# Patient Record
Sex: MALE | Race: BLACK OR AFRICAN AMERICAN | NOT HISPANIC OR LATINO | Employment: FULL TIME | URBAN - METROPOLITAN AREA
[De-identification: names, ages, dates, MRNs, and addresses within clinical notes are randomized per-mention and may not be internally consistent; named-entity substitution may affect disease eponyms.]

---

## 2019-05-13 ENCOUNTER — OFFICE VISIT (OUTPATIENT)
Dept: FAMILY MEDICINE | Facility: CLINIC | Age: 44
End: 2019-05-13
Payer: COMMERCIAL

## 2019-05-13 VITALS
BODY MASS INDEX: 31.88 KG/M2 | WEIGHT: 222.69 LBS | HEART RATE: 68 BPM | SYSTOLIC BLOOD PRESSURE: 122 MMHG | OXYGEN SATURATION: 98 % | TEMPERATURE: 98 F | DIASTOLIC BLOOD PRESSURE: 80 MMHG | HEIGHT: 70 IN

## 2019-05-13 DIAGNOSIS — E66.9 OBESITY, CLASS I, BMI 30-34.9: ICD-10-CM

## 2019-05-13 DIAGNOSIS — R07.89 OTHER CHEST PAIN: Primary | ICD-10-CM

## 2019-05-13 DIAGNOSIS — K21.9 GASTROESOPHAGEAL REFLUX DISEASE WITHOUT ESOPHAGITIS: ICD-10-CM

## 2019-05-13 DIAGNOSIS — I10 HYPERTENSION, UNSPECIFIED TYPE: ICD-10-CM

## 2019-05-13 PROCEDURE — 99999 PR PBB SHADOW E&M-EST. PATIENT-LVL III: ICD-10-PCS | Mod: PBBFAC,,, | Performed by: PHYSICIAN ASSISTANT

## 2019-05-13 PROCEDURE — 93010 ELECTROCARDIOGRAM REPORT: CPT | Mod: S$GLB,,, | Performed by: INTERNAL MEDICINE

## 2019-05-13 PROCEDURE — 3008F PR BODY MASS INDEX (BMI) DOCUMENTED: ICD-10-PCS | Mod: CPTII,S$GLB,, | Performed by: PHYSICIAN ASSISTANT

## 2019-05-13 PROCEDURE — 3008F BODY MASS INDEX DOCD: CPT | Mod: CPTII,S$GLB,, | Performed by: PHYSICIAN ASSISTANT

## 2019-05-13 PROCEDURE — 99214 OFFICE O/P EST MOD 30 MIN: CPT | Mod: 25,S$GLB,, | Performed by: PHYSICIAN ASSISTANT

## 2019-05-13 PROCEDURE — 3079F PR MOST RECENT DIASTOLIC BLOOD PRESSURE 80-89 MM HG: ICD-10-PCS | Mod: CPTII,S$GLB,, | Performed by: PHYSICIAN ASSISTANT

## 2019-05-13 PROCEDURE — 93010 EKG 12-LEAD: ICD-10-PCS | Mod: S$GLB,,, | Performed by: INTERNAL MEDICINE

## 2019-05-13 PROCEDURE — 3074F PR MOST RECENT SYSTOLIC BLOOD PRESSURE < 130 MM HG: ICD-10-PCS | Mod: CPTII,S$GLB,, | Performed by: PHYSICIAN ASSISTANT

## 2019-05-13 PROCEDURE — 3074F SYST BP LT 130 MM HG: CPT | Mod: CPTII,S$GLB,, | Performed by: PHYSICIAN ASSISTANT

## 2019-05-13 PROCEDURE — 99999 PR PBB SHADOW E&M-EST. PATIENT-LVL III: CPT | Mod: PBBFAC,,, | Performed by: PHYSICIAN ASSISTANT

## 2019-05-13 PROCEDURE — 3079F DIAST BP 80-89 MM HG: CPT | Mod: CPTII,S$GLB,, | Performed by: PHYSICIAN ASSISTANT

## 2019-05-13 PROCEDURE — 99214 PR OFFICE/OUTPT VISIT, EST, LEVL IV, 30-39 MIN: ICD-10-PCS | Mod: 25,S$GLB,, | Performed by: PHYSICIAN ASSISTANT

## 2019-05-13 NOTE — PATIENT INSTRUCTIONS
Noncardiac Chest Pain    Based on your visit today, the healthcare provider doesnt know what is causing your chest pain. In most cases, people who come to the emergency department with chest pain dont have a problem with their heart. Instead, the pain is caused by other conditions. It's important for the healthcare team to be sure you are not having a life threatening cause for chest pain such as a heart attack, blood clot in the lungs, collapsed lung, ruptured esophagus, or tearing of the aorta. Once these major causes have been ruled out, you may have further evaluation for non-heart causes of chest pain. These may be problems with the lungs, muscles, bones, digestive tract, nerves, or mental health.  Lung problems  · Inflammation around the lungs (pleurisy)  · Collapsed lung (pneumothorax)  · Fluid around the lungs (pleural effusion)  · Lung cancer (a rare cause of chest pain)  Muscle or bone problems  · Inflamed cartilage between the ribs (costochondritis)  · Fibromyalgia  · Rheumatoid arthritis  · Chest wall strain  Digestive system problems  · Reflux  · Stomach ulcer  · Spasms of the esophagus  · Gall stones  · Gallbladder inflammation  Mental health conditions  · Panic or anxiety attacks  · Emotional distress  Your condition doesnt seem serious and your pain doesnt appear to be coming from your heart. But sometimes the signs of a serious problem take more time to appear. Watch for the warning signs listed below.  Home care  Follow these guidelines when caring for yourself at home:  · Rest today and avoid strenuous activity.  · Take any prescribed medicine as directed.  Follow-up care  Follow up with your healthcare provider, or as advised, if you dont start to feel better within 24 hours.  When to seek medical advice  Call your healthcare provider right away if any of these occur:  · A change in the type of pain. Call if it feels different, becomes more serious, lasts longer, or begins to spread into  your shoulder, arm, neck, jaw, or back.  · Shortness of breath  · You feel more pain when you breathe  · Cough with dark-colored mucus or blood  · Weakness, dizziness, or fainting  · Fever of 100.4ºF (38ºC) or higher, or as directed by your healthcare provider  · Swelling, pain, or redness in one leg  Date Last Reviewed: 12/1/2016  © 7202-1441 [employer]. [address] [place] [address]. All rights reserved. This information is not intended as a substitute for professional medical care. Always follow your healthcare professional's instructions.        Tips to Control Acid Reflux    To control acid reflux, youll need to make some basic diet and lifestyle changes. The simple steps outlined below may be all youll need to ease discomfort.  Watch what you eat  · Avoid fatty foods and spicy foods.  · Eat fewer acidic foods, such as citrus and tomato-based foods. These can increase symptoms.  · Limit drinking alcohol, caffeine, and fizzy beverages. All increase acid reflux.  · Try limiting chocolate, peppermint, and spearmint. These can worsen acid reflux in some people.  Watch when you eat  · Avoid lying down for 3 hours after eating.  · Do not snack before going to bed.  Raise your head  Raising your head and upper body by 4 to 6 inches helps limit reflux when youre lying down. Put blocks under the head of your bed frame to raise it.  Other changes  · Lose weight, if you need to  · Dont exercise near bedtime  · Avoid tight-fitting clothes  · Limit aspirin and ibuprofen  · Stop smoking   Date Last Reviewed: 7/1/2016 © 2000-2017 [employer]. [address] [place] [address]. All rights reserved. This information is not intended as a substitute for professional medical care. Always follow your healthcare professional's instructions.

## 2019-05-13 NOTE — ASSESSMENT & PLAN NOTE
The patient is asked to make an attempt to improve diet and exercise patterns to aid in medical management of this problem. May resume current work out regimen.

## 2019-05-13 NOTE — ASSESSMENT & PLAN NOTE
Stable, normotensive on arrival without medication  He has not taken BP medication for two years  Had two isolated elevated home readings   Continue to monitor home pressures  Will follow up with PCP next week

## 2019-05-13 NOTE — PROGRESS NOTES
Patient Name: Josemanuel Perera    : 1975  MRN: [id number]    Subjective:  Josemanuel is a 44 y.o. male who presents today for:    Chief Complaint   Patient presents with    Chest Pain    Shortness of Breath       HPI  Patient has multiple medical diagnoses as listed below in the history. He complains of intermittent chest pain for two weeks. He reports the pain to feel like pressure in the sternum region. The chest pain is worse at night and wakes him up. He has occasional symptoms during the day when driving while working as . He currently rates CP 2/10. He has associated RAHMAN, nausea, and night sweats. He had two episodes of elevated blood pressure last week. He has history of HTN and taking HCTZ in past, but reports not being on medication for over two years. He is normotensive on arrival today.He has no history of GERD. He often eats fried and spicy foods. He recently started running/working out and reports the symptoms to have began shortly after. He denies alleviating or exacerbating factors.       Past Medical History  Past Medical History:   Diagnosis Date    Insomnia     Kidney stones     h/o kidney stones       Past Surgical History  History reviewed. No pertinent surgical history.    Family History  Family History   Problem Relation Age of Onset    Hypertension Mother        Social History  Social History     Socioeconomic History    Marital status:      Spouse name: Not on file    Number of children: Not on file    Years of education: Not on file    Highest education level: Not on file   Occupational History    Not on file   Social Needs    Financial resource strain: Not on file    Food insecurity:     Worry: Not on file     Inability: Not on file    Transportation needs:     Medical: Not on file     Non-medical: Not on file   Tobacco Use    Smoking status: Never Smoker    Smokeless tobacco: Never Used   Substance and Sexual Activity    Alcohol use: Yes     "Drug use: No    Sexual activity: Not on file   Lifestyle    Physical activity:     Days per week: Not on file     Minutes per session: Not on file    Stress: Not on file   Relationships    Social connections:     Talks on phone: Not on file     Gets together: Not on file     Attends Caodaism service: Not on file     Active member of club or organization: Not on file     Attends meetings of clubs or organizations: Not on file     Relationship status: Not on file   Other Topics Concern    Not on file   Social History Narrative    Not on file       Current Medications  Current Outpatient Medications on File Prior to Visit   Medication Sig Dispense Refill    amitriptyline (ELAVIL) 10 MG tablet Take 1 tablet (10 mg total) by mouth nightly as needed for Insomnia. 30 tablet 5    hydrochlorothiazide (HYDRODIURIL) 12.5 MG Tab Take 1 tablet (12.5 mg total) by mouth once daily. 30 tablet 2    naproxen (NAPROSYN) 500 MG tablet Take 1 tablet (500 mg total) by mouth 2 (two) times daily with meals. 30 tablet 0     No current facility-administered medications on file prior to visit.        Allergies   Review of patient's allergies indicates:  No Known Allergies      ROS  Review of Systems   Constitutional: Negative for chills, fatigue and fever.   Respiratory: Positive for shortness of breath. Negative for cough.    Cardiovascular: Positive for chest pain. Negative for palpitations and leg swelling.   Gastrointestinal: Positive for nausea. Negative for abdominal pain, constipation and vomiting.   Musculoskeletal: Negative for arthralgias and myalgias.   Skin: Negative for rash.   Neurological: Negative for dizziness, syncope, weakness, light-headedness, numbness and headaches.   Hematological: Negative for adenopathy.         Objective:    /80 (BP Location: Left arm, Patient Position: Sitting, BP Method: Medium (Manual))   Pulse 68   Temp 98.1 °F (36.7 °C) (Oral)   Ht 5' 10" (1.778 m)   Wt 101 kg (222 lb 10.6 " oz)   SpO2 98%   BMI 31.95 kg/m²     Physical Exam   Constitutional: Vital signs are normal.   HENT:   Head: Normocephalic.   Eyes: Pupils are equal, round, and reactive to light. EOM are normal.   Neck: Neck supple. Carotid bruit is not present. No thyroid mass present.   Cardiovascular: Normal rate, regular rhythm, S1 normal, S2 normal and intact distal pulses. Exam reveals no gallop and no friction rub.   No murmur heard.  Pulses:       Dorsalis pedis pulses are 2+ on the right side, and 2+ on the left side.   Pulmonary/Chest: Effort normal and breath sounds normal. He has no wheezes. He has no rales.   Lymphadenopathy:     He has no cervical adenopathy.        Right: No supraclavicular adenopathy present.        Left: No supraclavicular adenopathy present.   Neurological: He is alert.   Skin: Skin is warm, dry and intact. No rash noted.   Psychiatric: He has a normal mood and affect. Judgment normal.       Assessment/Plan:  Josemanuel Perera is a 44 y.o. male who presents today for :    Josemanuel was seen today for chest pain and shortness of breath.    Diagnoses and all orders for this visit:    Other chest pain  -     IN OFFICE EKG 12-LEAD (to Muse)  Normal EKG    Gastroesophageal reflux disease without esophagitis  -     ranitidine (ZANTAC) 150 MG tablet; Take 1 tablet (150 mg total) by mouth nightly.    Normal EKG in office, normotensive with normal heart sounds on exam  Likely acid reflux in nature given history and findings  Will initiate zantac for better control, instructed to use Tums or Rolaids for acute symptoms  Avoid spicy and fried foods  Scheduled annual exam with PCP as it has been > 2 years  Counseled patient on the clinical course of condition including symptomatology, treatment and prevention  Counseled regarding risks, benefits, and limitations of medications  Advised patient to seek urgent/emergent care if symptoms intensify  Sent patient with informational material about  diagnosis  Patient gave verbal understanding and agreement of plan      Problem list issues addressed during this visit    Hypertension  Stable, normotensive on arrival without medication  He has not taken BP medication for two years  Had two isolated elevated home readings   Continue to monitor home pressures  Will follow up with PCP next week      Obesity, Class I, BMI 30-34.9  The patient is asked to make an attempt to improve diet and exercise patterns to aid in medical management of this problem. May resume current work out regimen.            Health maintenance reviewed and disussed, deferred at this time, will follow up at PCP appt      I spent >50% of this 25 minute encounter counseling the patient on diagnoses, risk factors, and treatment.         Encouraged to call/return to clinic if symptoms persist or worsen    Bharti Zuniga PA-C  [facility] Family Med/ Internal Med/ Peds

## 2019-05-23 ENCOUNTER — OFFICE VISIT (OUTPATIENT)
Dept: FAMILY MEDICINE | Facility: CLINIC | Age: 44
End: 2019-05-23
Payer: COMMERCIAL

## 2019-05-23 VITALS
BODY MASS INDEX: 31.82 KG/M2 | TEMPERATURE: 99 F | HEIGHT: 70 IN | WEIGHT: 222.25 LBS | HEART RATE: 101 BPM | SYSTOLIC BLOOD PRESSURE: 110 MMHG | OXYGEN SATURATION: 98 % | DIASTOLIC BLOOD PRESSURE: 70 MMHG

## 2019-05-23 DIAGNOSIS — G47.09 OTHER INSOMNIA: ICD-10-CM

## 2019-05-23 DIAGNOSIS — Z23 NEED FOR DIPHTHERIA, TETANUS, PERTUSSIS, AND HIB VACCINATION: ICD-10-CM

## 2019-05-23 DIAGNOSIS — Z00.00 ROUTINE MEDICAL EXAM: Primary | ICD-10-CM

## 2019-05-23 DIAGNOSIS — E66.9 OBESITY, CLASS I, BMI 30-34.9: Chronic | ICD-10-CM

## 2019-05-23 DIAGNOSIS — I10 ESSENTIAL HYPERTENSION: ICD-10-CM

## 2019-05-23 PROCEDURE — 90471 IMMUNIZATION ADMIN: CPT | Mod: S$GLB,,, | Performed by: INTERNAL MEDICINE

## 2019-05-23 PROCEDURE — 99999 PR PBB SHADOW E&M-EST. PATIENT-LVL III: CPT | Mod: PBBFAC,,, | Performed by: INTERNAL MEDICINE

## 2019-05-23 PROCEDURE — 3074F PR MOST RECENT SYSTOLIC BLOOD PRESSURE < 130 MM HG: ICD-10-PCS | Mod: CPTII,S$GLB,, | Performed by: INTERNAL MEDICINE

## 2019-05-23 PROCEDURE — 90471 TDAP VACCINE GREATER THAN OR EQUAL TO 7YO IM: ICD-10-PCS | Mod: S$GLB,,, | Performed by: INTERNAL MEDICINE

## 2019-05-23 PROCEDURE — 90715 TDAP VACCINE GREATER THAN OR EQUAL TO 7YO IM: ICD-10-PCS | Mod: S$GLB,,, | Performed by: INTERNAL MEDICINE

## 2019-05-23 PROCEDURE — 99396 PREV VISIT EST AGE 40-64: CPT | Mod: 25,S$GLB,, | Performed by: INTERNAL MEDICINE

## 2019-05-23 PROCEDURE — 99396 PR PREVENTIVE VISIT,EST,40-64: ICD-10-PCS | Mod: 25,S$GLB,, | Performed by: INTERNAL MEDICINE

## 2019-05-23 PROCEDURE — 99999 PR PBB SHADOW E&M-EST. PATIENT-LVL III: ICD-10-PCS | Mod: PBBFAC,,, | Performed by: INTERNAL MEDICINE

## 2019-05-23 PROCEDURE — 3074F SYST BP LT 130 MM HG: CPT | Mod: CPTII,S$GLB,, | Performed by: INTERNAL MEDICINE

## 2019-05-23 PROCEDURE — 3078F PR MOST RECENT DIASTOLIC BLOOD PRESSURE < 80 MM HG: ICD-10-PCS | Mod: CPTII,S$GLB,, | Performed by: INTERNAL MEDICINE

## 2019-05-23 PROCEDURE — 3078F DIAST BP <80 MM HG: CPT | Mod: CPTII,S$GLB,, | Performed by: INTERNAL MEDICINE

## 2019-05-23 PROCEDURE — 90715 TDAP VACCINE 7 YRS/> IM: CPT | Mod: S$GLB,,, | Performed by: INTERNAL MEDICINE

## 2019-05-23 RX ORDER — HYDROCHLOROTHIAZIDE 12.5 MG/1
12.5 TABLET ORAL DAILY
Qty: 30 TABLET | Refills: 2 | Status: CANCELLED | OUTPATIENT
Start: 2019-05-23 | End: 2020-05-22

## 2019-05-23 RX ORDER — AMITRIPTYLINE HYDROCHLORIDE 10 MG/1
10 TABLET, FILM COATED ORAL NIGHTLY PRN
Qty: 90 TABLET | Refills: 1 | Status: SHIPPED | OUTPATIENT
Start: 2019-05-23 | End: 2019-09-26 | Stop reason: SDUPTHER

## 2019-05-23 NOTE — PATIENT INSTRUCTIONS
"Below is a list of free Apps that you may find helpful (some of them may not apply to you since this is a general list of helpful Apps):    Android & Apple users:  EatFit - Created by [facility] nutritionists.  Tons of great recipes, links to >100 restaurants in town with healthy choices, and shopping guides.    Fooducate - Helps with healthy diet and weight loss  Shop Well - Scan barcodes to foods to see if it is healthy or not.  It will also suggest healthier alternatives  Lose It - Calorie tracking and goal setting for weight loss.  Peer support is also available  Calorie Counter and Diet Tracker by MyFitnessPal - Helps count calories for food intake and calories burned during exercise  Popsugar Active - has pictures and videos of preloaded workout routines  [facility] Diabetes Pal - log for home sugars, diet, weight, and blood pressure  Headspace - Guides your through meditation.  The first 10 programs are free.  Stop, Think, and Breath - customizable meditation abelardo with ability to perform "check-ins"          "

## 2019-05-23 NOTE — PROGRESS NOTES
Assessment & Plan  Problem List Items Addressed This Visit        Cardiac/Vascular    RESOLVED: Hypertension       Endocrine    Obesity, Class I, BMI 30-34.9 (Chronic)    Current Assessment & Plan     The patient is asked to make an attempt to improve diet and exercise patterns to aid in medical management of this problem. We specifically discussed cutting calorie intake by 500-1000 calories per day for a goal of a 1-2 pound weight loss and recommendations for a mostly plant based diet with limited red meats/refined grains/processed foods/added sugars.           Other    Other insomnia    Overview     Shift work         Current Assessment & Plan     Continue PRN Elavil.          Relevant Medications    amitriptyline (ELAVIL) 10 MG tablet      Other Visit Diagnoses     Routine medical exam    -  Primary  -    Discussed healthy diet, regular exercise, necessary labs, age appropriate cancer screening, and routine vaccinations.       Relevant Orders    CBC auto differential    Comprehensive metabolic panel    Lipid panel    Need for diphtheria, tetanus, pertussis, and Hib vaccination    -  vaccinate    Relevant Orders    Tdap Vaccine Greater than or Eqaul to 7 y.o.            Health Maintenance reviewed, as above.     Follow-up: Follow up in about 1 year (around 5/23/2020) for Routine Physical.    ______________________________________________________________________    Chief Complaint  Chief Complaint   Patient presents with    Annual Exam       HPI  Josemanuel Perera is a 44 y.o. male with multiple medical diagnoses as listed in the medical history and problem list that presents for routine physical.  Pt is known to me with his last appointment 5/13/2019.  I last saw the patient 03/2015.     Gastritis symptoms improved on H2 blocker.  Taking this once nightly.  No other acute complaints.       PAST MEDICAL HISTORY:  Past Medical History:   Diagnosis Date    Hypertension 2/26/2015    Insomnia     Kidney stones      h/o kidney stones       PAST SURGICAL HISTORY:  History reviewed. No pertinent surgical history.    SOCIAL HISTORY:  Social History     Socioeconomic History    Marital status:      Spouse name: Not on file    Number of children: Not on file    Years of education: Not on file    Highest education level: Not on file   Occupational History    Not on file   Social Needs    Financial resource strain: Not on file    Food insecurity:     Worry: Not on file     Inability: Not on file    Transportation needs:     Medical: Not on file     Non-medical: Not on file   Tobacco Use    Smoking status: Never Smoker    Smokeless tobacco: Never Used   Substance and Sexual Activity    Alcohol use: Yes    Drug use: No    Sexual activity: Not on file   Lifestyle    Physical activity:     Days per week: Not on file     Minutes per session: Not on file    Stress: Not on file   Relationships    Social connections:     Talks on phone: Not on file     Gets together: Not on file     Attends Spiritism service: Not on file     Active member of club or organization: Not on file     Attends meetings of clubs or organizations: Not on file     Relationship status: Not on file   Other Topics Concern    Not on file   Social History Narrative    Not on file       FAMILY HISTORY:  Family History   Problem Relation Age of Onset    Hypertension Mother        ALLERGIES AND MEDICATIONS: updated and reviewed.  Review of patient's allergies indicates:  No Known Allergies  Current Outpatient Medications   Medication Sig Dispense Refill    ranitidine (ZANTAC) 150 MG tablet Take 1 tablet (150 mg total) by mouth nightly. 60 tablet 3    amitriptyline (ELAVIL) 10 MG tablet Take 1 tablet (10 mg total) by mouth nightly as needed for Insomnia. 90 tablet 1    hydrochlorothiazide (HYDRODIURIL) 12.5 MG Tab Take 1 tablet (12.5 mg total) by mouth once daily. 30 tablet 2    naproxen (NAPROSYN) 500 MG tablet Take 1 tablet (500 mg total) by  "mouth 2 (two) times daily with meals. 30 tablet 0     No current facility-administered medications for this visit.          ROS  Review of Systems   Constitutional: Positive for diaphoresis. Negative for chills, fatigue, fever and unexpected weight change.   HENT: Negative for congestion, dental problem, ear discharge, ear pain, hearing loss, postnasal drip, rhinorrhea, sinus pressure, sore throat and trouble swallowing.    Eyes: Negative for pain, discharge, redness, itching and visual disturbance.   Respiratory: Negative for cough, chest tightness, shortness of breath and wheezing.    Cardiovascular: Negative for chest pain, palpitations and leg swelling.   Gastrointestinal: Negative for abdominal distention, abdominal pain, blood in stool, constipation, diarrhea, nausea and vomiting.        No melena   Endocrine: Negative for cold intolerance, heat intolerance, polydipsia, polyphagia and polyuria.        No nocturia   Genitourinary: Negative for decreased urine volume, difficulty urinating, discharge, dysuria, flank pain, frequency, hematuria, penile pain, penile swelling, scrotal swelling, testicular pain and urgency.   Musculoskeletal: Negative for arthralgias, gait problem, joint swelling, myalgias, neck pain and neck stiffness.   Skin: Negative for color change, pallor and rash.   Neurological: Positive for light-headedness (rare, when out in the heat). Negative for dizziness, tremors, seizures, syncope, weakness and headaches.   Hematological: Negative for adenopathy. Does not bruise/bleed easily.   Psychiatric/Behavioral: Positive for sleep disturbance. Negative for confusion, decreased concentration and suicidal ideas. The patient is not nervous/anxious.         No depression           Physical Exam  Vitals:    05/23/19 1316   BP: 110/70   Pulse: 101   Temp: 98.6 °F (37 °C)   SpO2: 98%   Weight: 100.8 kg (222 lb 3.6 oz)   Height: 5' 10" (1.778 m)    Body mass index is 31.89 kg/m².  Weight: 100.8 kg (222 lb " "3.6 oz)   Height: 5' 10" (177.8 cm)   Physical Exam   Constitutional: He is oriented to person, place, and time. He appears well-developed and well-nourished. No distress.   HENT:   Head: Normocephalic and atraumatic.   Right Ear: Tympanic membrane, external ear and ear canal normal.   Left Ear: Tympanic membrane, external ear and ear canal normal.   Nose: Nose normal. No septal deviation. Right sinus exhibits no maxillary sinus tenderness and no frontal sinus tenderness. Left sinus exhibits no maxillary sinus tenderness and no frontal sinus tenderness.   Mouth/Throat: Uvula is midline, oropharynx is clear and moist and mucous membranes are normal. No tonsillar exudate.   Eyes: Pupils are equal, round, and reactive to light. Conjunctivae and EOM are normal. Right eye exhibits no discharge. Left eye exhibits no discharge. No scleral icterus.   Neck: Neck supple. No JVD present. No spinous process tenderness and no muscular tenderness present. Carotid bruit is not present. No tracheal deviation present. No thyroid mass and no thyromegaly present.   Cardiovascular: Normal rate, regular rhythm, normal heart sounds and intact distal pulses. Exam reveals no S3, no S4 and no friction rub.   No murmur heard.  Pulmonary/Chest: Effort normal and breath sounds normal. He has no wheezes. He has no rhonchi. He has no rales.   Abdominal: Soft. Bowel sounds are normal. He exhibits no distension. There is no tenderness. There is no rebound, no guarding and no CVA tenderness.   Musculoskeletal: Normal range of motion. He exhibits no edema or tenderness.   Lymphadenopathy:        Head (right side): No submental and no submandibular adenopathy present.        Head (left side): No submental and no submandibular adenopathy present.     He has no cervical adenopathy.   Neurological: He is alert and oriented to person, place, and time. Coordination normal.   Motor grossly intact.  Sensation grossly normal.  Symmetric facial movements " palate elevated symmetrically tongue midline    Skin: Skin is warm and dry. Capillary refill takes less than 2 seconds. No rash noted. No cyanosis. Nails show no clubbing.   Psychiatric: He has a normal mood and affect. His speech is normal and behavior is normal. Thought content normal. Cognition and memory are normal.         Health Maintenance       Date Due Completion Date    TETANUS VACCINE 03/04/1994 ---    Influenza Vaccine 08/01/2019 ---    Lipid Panel 03/25/2020 3/25/2015    Override on 3/22/2013: (N/S)

## 2019-06-01 ENCOUNTER — LAB VISIT (OUTPATIENT)
Dept: LAB | Facility: HOSPITAL | Age: 44
End: 2019-06-01
Attending: INTERNAL MEDICINE
Payer: COMMERCIAL

## 2019-06-01 DIAGNOSIS — Z00.00 ROUTINE MEDICAL EXAM: ICD-10-CM

## 2019-06-01 LAB
ALBUMIN SERPL BCP-MCNC: 3.8 G/DL (ref 3.5–5.2)
ALP SERPL-CCNC: 107 U/L (ref 55–135)
ALT SERPL W/O P-5'-P-CCNC: 91 U/L (ref 10–44)
ANION GAP SERPL CALC-SCNC: 12 MMOL/L (ref 8–16)
AST SERPL-CCNC: 51 U/L (ref 10–40)
BASOPHILS # BLD AUTO: 0.01 K/UL (ref 0–0.2)
BASOPHILS NFR BLD: 0.2 % (ref 0–1.9)
BILIRUB SERPL-MCNC: 0.5 MG/DL (ref 0.1–1)
BUN SERPL-MCNC: 16 MG/DL (ref 6–20)
CALCIUM SERPL-MCNC: 9.6 MG/DL (ref 8.7–10.5)
CHLORIDE SERPL-SCNC: 106 MMOL/L (ref 95–110)
CHOLEST SERPL-MCNC: 174 MG/DL (ref 120–199)
CHOLEST/HDLC SERPL: 4.6 {RATIO} (ref 2–5)
CO2 SERPL-SCNC: 20 MMOL/L (ref 23–29)
CREAT SERPL-MCNC: 1.4 MG/DL (ref 0.5–1.4)
DIFFERENTIAL METHOD: ABNORMAL
EOSINOPHIL # BLD AUTO: 0.2 K/UL (ref 0–0.5)
EOSINOPHIL NFR BLD: 3.6 % (ref 0–8)
ERYTHROCYTE [DISTWIDTH] IN BLOOD BY AUTOMATED COUNT: 12 % (ref 11.5–14.5)
EST. GFR  (AFRICAN AMERICAN): >60 ML/MIN/1.73 M^2
EST. GFR  (NON AFRICAN AMERICAN): >60 ML/MIN/1.73 M^2
GLUCOSE SERPL-MCNC: 93 MG/DL (ref 70–110)
HCT VFR BLD AUTO: 42.3 % (ref 40–54)
HDLC SERPL-MCNC: 38 MG/DL (ref 40–75)
HDLC SERPL: 21.8 % (ref 20–50)
HGB BLD-MCNC: 14.2 G/DL (ref 14–18)
IMM GRANULOCYTES # BLD AUTO: 0.01 K/UL (ref 0–0.04)
IMM GRANULOCYTES NFR BLD AUTO: 0.2 % (ref 0–0.5)
LDLC SERPL CALC-MCNC: 116.4 MG/DL (ref 63–159)
LYMPHOCYTES # BLD AUTO: 1.5 K/UL (ref 1–4.8)
LYMPHOCYTES NFR BLD: 31.1 % (ref 18–48)
MCH RBC QN AUTO: 29.6 PG (ref 27–31)
MCHC RBC AUTO-ENTMCNC: 33.6 G/DL (ref 32–36)
MCV RBC AUTO: 88 FL (ref 82–98)
MONOCYTES # BLD AUTO: 0.9 K/UL (ref 0.3–1)
MONOCYTES NFR BLD: 18.6 % (ref 4–15)
NEUTROPHILS # BLD AUTO: 2.2 K/UL (ref 1.8–7.7)
NEUTROPHILS NFR BLD: 46.3 % (ref 38–73)
NONHDLC SERPL-MCNC: 136 MG/DL
NRBC BLD-RTO: 0 /100 WBC
PLATELET # BLD AUTO: 296 K/UL (ref 150–350)
PMV BLD AUTO: 10.2 FL (ref 9.2–12.9)
POTASSIUM SERPL-SCNC: 4.5 MMOL/L (ref 3.5–5.1)
PROT SERPL-MCNC: 7.9 G/DL (ref 6–8.4)
RBC # BLD AUTO: 4.79 M/UL (ref 4.6–6.2)
SODIUM SERPL-SCNC: 138 MMOL/L (ref 136–145)
TRIGL SERPL-MCNC: 98 MG/DL (ref 30–150)
WBC # BLD AUTO: 4.72 K/UL (ref 3.9–12.7)

## 2019-06-01 PROCEDURE — 80061 LIPID PANEL: CPT

## 2019-06-01 PROCEDURE — 36415 COLL VENOUS BLD VENIPUNCTURE: CPT | Mod: PO

## 2019-06-01 PROCEDURE — 85025 COMPLETE CBC W/AUTO DIFF WBC: CPT

## 2019-06-01 PROCEDURE — 80053 COMPREHEN METABOLIC PANEL: CPT

## 2019-06-03 ENCOUNTER — TELEPHONE (OUTPATIENT)
Dept: FAMILY MEDICINE | Facility: CLINIC | Age: 44
End: 2019-06-03

## 2019-06-03 DIAGNOSIS — R74.01 TRANSAMINITIS: Primary | ICD-10-CM

## 2019-06-03 NOTE — TELEPHONE ENCOUNTER
Please inform the patient that his labs generally looked good but his liver enzymes were elevated which is a marker for liver inflammation.  His cholesterol was also up slightly.       At this time, I would recommend that he continue with the healthy eating habits that we discussed during his visit and repeat the liver labs at the beginning of July.    Please sched non-fasting labs.     Thank you,  Christoph

## 2019-09-26 ENCOUNTER — TELEPHONE (OUTPATIENT)
Dept: FAMILY MEDICINE | Facility: CLINIC | Age: 44
End: 2019-09-26

## 2019-09-26 ENCOUNTER — OFFICE VISIT (OUTPATIENT)
Dept: FAMILY MEDICINE | Facility: CLINIC | Age: 44
End: 2019-09-26
Payer: COMMERCIAL

## 2019-09-26 VITALS
DIASTOLIC BLOOD PRESSURE: 87 MMHG | TEMPERATURE: 99 F | BODY MASS INDEX: 31.42 KG/M2 | OXYGEN SATURATION: 96 % | HEART RATE: 86 BPM | SYSTOLIC BLOOD PRESSURE: 119 MMHG | HEIGHT: 70 IN | WEIGHT: 219.44 LBS

## 2019-09-26 DIAGNOSIS — R74.01 TRANSAMINITIS: ICD-10-CM

## 2019-09-26 DIAGNOSIS — G47.09 OTHER INSOMNIA: ICD-10-CM

## 2019-09-26 DIAGNOSIS — K21.9 GASTROESOPHAGEAL REFLUX DISEASE WITHOUT ESOPHAGITIS: ICD-10-CM

## 2019-09-26 DIAGNOSIS — N52.9 MALE ERECTILE DISORDER: Primary | ICD-10-CM

## 2019-09-26 DIAGNOSIS — M25.562 LEFT KNEE PAIN: ICD-10-CM

## 2019-09-26 DIAGNOSIS — I10 ESSENTIAL HYPERTENSION: ICD-10-CM

## 2019-09-26 PROCEDURE — 99999 PR PBB SHADOW E&M-EST. PATIENT-LVL III: CPT | Mod: PBBFAC,,, | Performed by: NURSE PRACTITIONER

## 2019-09-26 PROCEDURE — 3074F SYST BP LT 130 MM HG: CPT | Mod: CPTII,S$GLB,, | Performed by: NURSE PRACTITIONER

## 2019-09-26 PROCEDURE — 3008F PR BODY MASS INDEX (BMI) DOCUMENTED: ICD-10-PCS | Mod: CPTII,S$GLB,, | Performed by: NURSE PRACTITIONER

## 2019-09-26 PROCEDURE — 99214 OFFICE O/P EST MOD 30 MIN: CPT | Mod: S$GLB,,, | Performed by: NURSE PRACTITIONER

## 2019-09-26 PROCEDURE — 99214 PR OFFICE/OUTPT VISIT, EST, LEVL IV, 30-39 MIN: ICD-10-PCS | Mod: S$GLB,,, | Performed by: NURSE PRACTITIONER

## 2019-09-26 PROCEDURE — 3074F PR MOST RECENT SYSTOLIC BLOOD PRESSURE < 130 MM HG: ICD-10-PCS | Mod: CPTII,S$GLB,, | Performed by: NURSE PRACTITIONER

## 2019-09-26 PROCEDURE — 3079F DIAST BP 80-89 MM HG: CPT | Mod: CPTII,S$GLB,, | Performed by: NURSE PRACTITIONER

## 2019-09-26 PROCEDURE — 3008F BODY MASS INDEX DOCD: CPT | Mod: CPTII,S$GLB,, | Performed by: NURSE PRACTITIONER

## 2019-09-26 PROCEDURE — 99999 PR PBB SHADOW E&M-EST. PATIENT-LVL III: ICD-10-PCS | Mod: PBBFAC,,, | Performed by: NURSE PRACTITIONER

## 2019-09-26 PROCEDURE — 3079F PR MOST RECENT DIASTOLIC BLOOD PRESSURE 80-89 MM HG: ICD-10-PCS | Mod: CPTII,S$GLB,, | Performed by: NURSE PRACTITIONER

## 2019-09-26 RX ORDER — HYDROCHLOROTHIAZIDE 12.5 MG/1
12.5 TABLET ORAL DAILY
Qty: 30 TABLET | Refills: 2 | Status: CANCELLED | OUTPATIENT
Start: 2019-09-26 | End: 2020-09-25

## 2019-09-26 RX ORDER — AMITRIPTYLINE HYDROCHLORIDE 10 MG/1
10 TABLET, FILM COATED ORAL NIGHTLY PRN
Qty: 90 TABLET | Refills: 1 | Status: SHIPPED | OUTPATIENT
Start: 2019-09-26 | End: 2021-09-08 | Stop reason: SDUPTHER

## 2019-09-26 RX ORDER — SILDENAFIL 25 MG/1
25 TABLET, FILM COATED ORAL DAILY PRN
Qty: 15 TABLET | Refills: 0 | Status: SHIPPED | OUTPATIENT
Start: 2019-09-26 | End: 2021-09-08 | Stop reason: SDUPTHER

## 2019-09-26 RX ORDER — NAPROXEN 500 MG/1
500 TABLET ORAL 2 TIMES DAILY WITH MEALS
Qty: 30 TABLET | Refills: 3 | Status: SHIPPED | OUTPATIENT
Start: 2019-09-26 | End: 2020-11-03 | Stop reason: SDUPTHER

## 2019-09-26 NOTE — PROGRESS NOTES
History of Present Illness   Josemanuel Perera is a 44 y.o. man with medical history as listed below who presents today for evaluation of erectile dysfunction. He is unsure onset of symptoms. He reports that he is able to obtain an erection but is unable to maintain an erection. He is unsure of last ejaculation. He reports a normal libido. He reports infrequent alcohol use and denies substance or drug abuse. His blood pressure is well controlled, and he denies symptoms of hyperglycemia or history of diabetes. He denies stress or relationship problems. He denies performance anxiety. He is taking his Amitriptyline daily at bedtime. He has no additional complaints and is otherwise healthy on today's visit.    Past Medical History:   Diagnosis Date    Hypertension 2/26/2015    Insomnia     Kidney stones     h/o kidney stones       History reviewed. No pertinent surgical history.    Social History     Socioeconomic History    Marital status:      Spouse name: Not on file    Number of children: Not on file    Years of education: Not on file    Highest education level: Not on file   Occupational History    Not on file   Social Needs    Financial resource strain: Not on file    Food insecurity:     Worry: Not on file     Inability: Not on file    Transportation needs:     Medical: Not on file     Non-medical: Not on file   Tobacco Use    Smoking status: Never Smoker    Smokeless tobacco: Never Used   Substance and Sexual Activity    Alcohol use: Yes    Drug use: No    Sexual activity: Not on file   Lifestyle    Physical activity:     Days per week: Not on file     Minutes per session: Not on file    Stress: Not on file   Relationships    Social connections:     Talks on phone: Not on file     Gets together: Not on file     Attends [employer] service: Not on file     Active member of club or organization: Not on file     Attends meetings of clubs or organizations: Not on file     Relationship  "status: Not on file   Other Topics Concern    Not on file   Social History Narrative    Not on file       Family History   Problem Relation Age of Onset    Hypertension Mother        Review of Systems  Review of Systems   Constitutional: Negative for fever and malaise/fatigue.   Cardiovascular: Negative for chest pain and palpitations.   Genitourinary: Negative for dysuria, flank pain and hematuria.   Psychiatric/Behavioral: Negative for depression and substance abuse. The patient has insomnia. The patient is not nervous/anxious.      A complete review of systems was otherwise negative.    Physical Exam  /87   Pulse 86   Temp 98.6 °F (37 °C) (Oral)   Ht 5' 10" (1.778 m)   Wt 99.6 kg (219 lb 7.5 oz)   SpO2 96%   BMI 31.49 kg/m²   General appearance: alert, appears stated age and cooperative  Neck: no carotid bruit, no JVD and supple, symmetrical, trachea midline  Lungs: clear to auscultation bilaterally  Heart: regular rate and rhythm, S1, S2 normal, no murmur, click, rub or gallop  Male genitalia: normal  Skin: Skin color, texture, turgor normal. No rashes or lesions  Lymph nodes: Cervical, supraclavicular, and axillary nodes normal.  Neurologic: Grossly normal    Assessment/Plan  Male erectile disorder  We discussed possible etiology- unclear at this time.  I suggested that we stop his Elavil to see if this could be contributing to his symptoms, but he declined.  He would like to try Viagra. I have provided a few pills and discussed the SE with the patient.  Provided the patient with a handout on management of ED.  If no improvement or for refills of the Viagra, we will schedule him with Dr. Hogan for follow-up.  -     sildenafil (VIAGRA) 25 MG tablet; Take 1 tablet (25 mg total) by mouth daily as needed for Erectile Dysfunction.  Dispense: 15 tablet; Refill: 0    Other insomnia  As above.  -     amitriptyline (ELAVIL) 10 MG tablet; Take 1 tablet (10 mg total) by mouth nightly as needed for " Insomnia.  Dispense: 90 tablet; Refill: 1    Gastroesophageal reflux disease without esophagitis  Stable off Zantac.    Left knee pain  The current medical regimen is effective;  continue present plan and medications.  PRN use, rarely needed.  -     naproxen (NAPROSYN) 500 MG tablet; Take 1 tablet (500 mg total) by mouth 2 (two) times daily with meals.  Dispense: 30 tablet; Refill: 3    Essential hypertension  The current medical regimen is effective;  continue present plan and medications.    Transaminitis   Due for repeat labs to reassess liver enzymes. Will schedule.  -     Comprehensive metabolic panel; Future; Expected date: 09/26/2019  -     HEPATITIS PANEL, ACUTE; Future; Expected date: 09/26/2019        Patient has verbalized understanding and is in agreement with plan of care.    Follow up in about 8 months (around 5/26/2020).

## 2019-09-26 NOTE — TELEPHONE ENCOUNTER
Please call the patient, I forgot to mention to him that we need to repeat his liver enzymes which were elevated the last time they were checked.    Please schedule the labs I ordered.    Thanks!  KARI Woods

## 2019-09-26 NOTE — PATIENT INSTRUCTIONS
Understanding Erectile Dysfunction    Erectile dysfunction (ED) is a problem getting an erection firm enough or keeping it long enough for intercourse. The problem can happen to any man at any age. But health problems that can lead to ED become more common as a man ages. Up to half of men over age 40 experience ED at some point.  Causes of ED  ED can have many causes. Most are physical. Some are emotional issues. Often, a combination of causes is involved. Causes of ED may include:  · Medical conditions such as diabetes or depression  · Smoking tobacco or marijuana  · Drinking too much alcohol  · Side effects of medications  · Injury to nerves or blood vessels  · Emotional issues such as stress or relationship problems  ED can be treated  Prescription medications for ED are available. They help many men who try them. Depending upon the cause of the ED, though, medications may not be enough. In these cases, other treatment options are available. These include erectile aids and surgery. Your health care provider can tell you more about the treatment that is right for you. And new treatments for ED are being studied. No matter what the treatment you decide on, stay in touch with your doctor. If your symptoms persist, he or she may be able to adjust your current treatment or try something new.  Date Last Reviewed: 1/1/2017  © 9716-5246 [employer]. [address] [place] [address], [place] [address]. All rights reserved. This information is not intended as a substitute for professional medical care. Always follow your healthcare professional's instructions.

## 2020-04-09 ENCOUNTER — TELEPHONE (OUTPATIENT)
Dept: FAMILY MEDICINE | Facility: CLINIC | Age: 45
End: 2020-04-09

## 2020-04-09 NOTE — TELEPHONE ENCOUNTER
----- Message from Yahaira Rosario sent at 4/9/2020 10:12 AM CDT -----  Contact: Eb  Type: Patient Call Back    Who called: Eb    What is the request in detail: Eb is requesting a call back in regards to questions about the pt prescriptions.     Can the clinic reply by [facility]?    Would the patient rather a call back or a response via [facility]? Call back     Best call back number: [phone]

## 2020-05-01 ENCOUNTER — OFFICE VISIT (OUTPATIENT)
Dept: FAMILY MEDICINE | Facility: CLINIC | Age: 45
End: 2020-05-01
Payer: COMMERCIAL

## 2020-05-01 DIAGNOSIS — R51.9 NONINTRACTABLE HEADACHE, UNSPECIFIED CHRONICITY PATTERN, UNSPECIFIED HEADACHE TYPE: Primary | ICD-10-CM

## 2020-05-01 DIAGNOSIS — G56.02 CARPAL TUNNEL SYNDROME OF LEFT WRIST: ICD-10-CM

## 2020-05-01 PROCEDURE — 99214 OFFICE O/P EST MOD 30 MIN: CPT | Mod: 95,,, | Performed by: INTERNAL MEDICINE

## 2020-05-01 PROCEDURE — 99214 PR OFFICE/OUTPT VISIT, EST, LEVL IV, 30-39 MIN: ICD-10-PCS | Mod: 95,,, | Performed by: INTERNAL MEDICINE

## 2020-05-01 NOTE — PROGRESS NOTES
Assessment & Plan  Problem List Items Addressed This Visit     None      Visit Diagnoses     Nonintractable headache, unspecified chronicity pattern, unspecified headache type    -  Primary-  Seems migrainous by history.  I asked him to get a BP machine and start checking his BPs.  Ok to use ibuprofen.     Carpal tunnel syndrome of left wrist    -  Counseled on ice and bracing at night.             Health Maintenance reviewed, deferred.    The patient location is:  Patient Home   The chief complaint leading to consultation is: noted below  Visit type: Virtual visit with synchronous audio and video  Total time spent with patient: 20  Each patient to whom he or she provides medical services by telemedicine is:  (1) informed of the relationship between the physician and patient and the respective role of any other health care provider with respect to management of the patient; and (2) notified that he or she may decline to receive medical services by telemedicine and may withdraw from such care at any time.    Follow-up: Follow up if symptoms worsen or fail to improve.    ______________________________________________________________________    Chief Complaint  Chief Complaint   Patient presents with    Blood Pressure Check    Headache       HPI  Josemanuel Perera is a 45 y.o. male with multiple medical diagnoses as listed in the medical history and problem list that presents for blood pressure concerns via virtual visit.  Pt is known to me with his last appointment 09/2019.      He previously had a diagnosis of high pressure but was able to come off of meds.  He is concerned that his BP is going up because he has been feeling warm and having HA.  The HA is right frontal located and throbs.  + nausea.  + photophobia.  No phonophobia.  No eye redness or drainage or jaw claudication.  He tried taking ibuprofen 400mg which helps some.  HA have been present for 2 months & occur nearly daily now.  He will not always  medicate the HA with ibuprofen.      He is also c/o left hand pain.  Paresthesia in the hand.  Worse at night.  No weakness.  Worse in median nerve distribution. No trauma.     PAST MEDICAL HISTORY:  Past Medical History:   Diagnosis Date    Hypertension 2/26/2015    Insomnia     Kidney stones     h/o kidney stones       PAST SURGICAL HISTORY:  History reviewed. No pertinent surgical history.    SOCIAL HISTORY:  Social History     Socioeconomic History    Marital status:      Spouse name: Not on file    Number of children: Not on file    Years of education: Not on file    Highest education level: Not on file   Occupational History    Not on file   Social Needs    Financial resource strain: Not on file    Food insecurity:     Worry: Not on file     Inability: Not on file    Transportation needs:     Medical: Not on file     Non-medical: Not on file   Tobacco Use    Smoking status: Never Smoker    Smokeless tobacco: Never Used   Substance and Sexual Activity    Alcohol use: Yes    Drug use: No    Sexual activity: Not on file   Lifestyle    Physical activity:     Days per week: Not on file     Minutes per session: Not on file    Stress: Not on file   Relationships    Social connections:     Talks on phone: Not on file     Gets together: Not on file     Attends Religion service: Not on file     Active member of club or organization: Not on file     Attends meetings of clubs or organizations: Not on file     Relationship status: Not on file   Other Topics Concern    Not on file   Social History Narrative    Not on file       FAMILY HISTORY:  Family History   Problem Relation Age of Onset    Hypertension Mother        ALLERGIES AND MEDICATIONS: updated and reviewed.  Review of patient's allergies indicates:  No Known Allergies  Current Outpatient Medications   Medication Sig Dispense Refill    amitriptyline (ELAVIL) 10 MG tablet Take 1 tablet (10 mg total) by mouth nightly as needed for  Insomnia. 90 tablet 1    hydrochlorothiazide (HYDRODIURIL) 12.5 MG Tab Take 1 tablet (12.5 mg total) by mouth once daily. 30 tablet 2    naproxen (NAPROSYN) 500 MG tablet Take 1 tablet (500 mg total) by mouth 2 (two) times daily with meals. 30 tablet 3    ranitidine (ZANTAC) 150 MG tablet Take 1 tablet (150 mg total) by mouth nightly. 60 tablet 3    sildenafil (VIAGRA) 25 MG tablet Take 1 tablet (25 mg total) by mouth daily as needed for Erectile Dysfunction. 15 tablet 0     No current facility-administered medications for this visit.          ROS  Review of Systems   Constitutional: Negative for chills, fever and unexpected weight change.   HENT: Negative for congestion, dental problem, ear pain, hearing loss, rhinorrhea, sore throat and trouble swallowing.    Eyes: Positive for photophobia. Negative for discharge, redness and visual disturbance.   Respiratory: Negative for cough, chest tightness, shortness of breath and wheezing.    Cardiovascular: Negative for chest pain, palpitations and leg swelling.   Gastrointestinal: Positive for nausea. Negative for abdominal pain, constipation, diarrhea and vomiting.   Endocrine: Negative for polydipsia, polyphagia and polyuria.   Genitourinary: Negative for decreased urine volume, dysuria and hematuria.   Musculoskeletal: Positive for arthralgias. Negative for myalgias.   Skin: Negative for color change and rash.   Neurological: Positive for headaches. Negative for dizziness, weakness and light-headedness.   Psychiatric/Behavioral: Negative for decreased concentration, dysphoric mood, sleep disturbance and suicidal ideas.           Physical Exam  Physical Exam   Constitutional: He is oriented to person, place, and time. He appears well-developed and well-nourished. No distress.   HENT:   Head: Normocephalic and atraumatic.   Eyes: Conjunctivae and EOM are normal.   Neck: Normal range of motion.   Pulmonary/Chest: Effort normal. No respiratory distress.    Musculoskeletal: Normal range of motion. He exhibits no edema or tenderness.   Neurological: He is alert and oriented to person, place, and time.   Psychiatric: He has a normal mood and affect. His behavior is normal. Judgment and thought content normal.         Health Maintenance       Date Due Completion Date    HIV Screening 03/04/1991 ---    Influenza Vaccine (Season Ended) 09/01/2020 ---    Lipid Panel 06/01/2024 6/1/2019    Override on 3/22/2013: (N/S)    TETANUS VACCINE 05/23/2029 5/23/2019

## 2020-05-01 NOTE — PATIENT INSTRUCTIONS
Check your blood pressure before taking anything for the HA.  If the pressure is normal, then this is likely more of a migraine picture.      If the BP is high, take something for the HA such as ibuprofen or tylenol.  Once you have had a period of at least 30 minutes without HA, recheck the BP.      To get an accurate BP reading follow the following steps:    https://www.heart.org/en/health-topics/high-blood-pressure/understanding-blood-pressure-readings/monitoring-your-blood-pressure-at-home    Get a Carpal Tunnel wrist brace and wear it to sleep.    No

## 2020-10-06 ENCOUNTER — HOSPITAL ENCOUNTER (OUTPATIENT)
Dept: RADIOLOGY | Facility: HOSPITAL | Age: 45
Discharge: HOME | End: 2020-10-06
Attending: NURSE PRACTITIONER
Payer: COMMERCIAL

## 2020-10-06 ENCOUNTER — OFFICE VISIT (OUTPATIENT)
Dept: FAMILY MEDICINE | Facility: CLINIC | Age: 45
End: 2020-10-06
Payer: COMMERCIAL

## 2020-10-06 VITALS
BODY MASS INDEX: 33.12 KG/M2 | HEART RATE: 70 BPM | OXYGEN SATURATION: 97 % | WEIGHT: 231.38 LBS | HEIGHT: 70 IN | TEMPERATURE: 99 F | SYSTOLIC BLOOD PRESSURE: 128 MMHG | DIASTOLIC BLOOD PRESSURE: 90 MMHG

## 2020-10-06 DIAGNOSIS — S29.9XXA CHEST INJURY, INITIAL ENCOUNTER: ICD-10-CM

## 2020-10-06 DIAGNOSIS — S49.91XA ARM INJURY, RIGHT, INITIAL ENCOUNTER: Primary | ICD-10-CM

## 2020-10-06 DIAGNOSIS — S09.90XA HEAD TRAUMA, INITIAL ENCOUNTER: ICD-10-CM

## 2020-10-06 DIAGNOSIS — I10 ESSENTIAL HYPERTENSION: ICD-10-CM

## 2020-10-06 PROCEDURE — 3080F PR MOST RECENT DIASTOLIC BLOOD PRESSURE >= 90 MM HG: ICD-10-PCS | Mod: CPTII,S$GLB,, | Performed by: NURSE PRACTITIONER

## 2020-10-06 PROCEDURE — 3008F BODY MASS INDEX DOCD: CPT | Mod: CPTII,S$GLB,, | Performed by: NURSE PRACTITIONER

## 2020-10-06 PROCEDURE — 70450 CT HEAD WITHOUT CONTRAST: ICD-10-PCS | Mod: 26,,, | Performed by: RADIOLOGY

## 2020-10-06 PROCEDURE — 70450 CT HEAD/BRAIN W/O DYE: CPT | Mod: TC

## 2020-10-06 PROCEDURE — 3074F PR MOST RECENT SYSTOLIC BLOOD PRESSURE < 130 MM HG: ICD-10-PCS | Mod: CPTII,S$GLB,, | Performed by: NURSE PRACTITIONER

## 2020-10-06 PROCEDURE — 3080F DIAST BP >= 90 MM HG: CPT | Mod: CPTII,S$GLB,, | Performed by: NURSE PRACTITIONER

## 2020-10-06 PROCEDURE — 99999 PR PBB SHADOW E&M-EST. PATIENT-LVL V: ICD-10-PCS | Mod: PBBFAC,,, | Performed by: NURSE PRACTITIONER

## 2020-10-06 PROCEDURE — 3008F PR BODY MASS INDEX (BMI) DOCUMENTED: ICD-10-PCS | Mod: CPTII,S$GLB,, | Performed by: NURSE PRACTITIONER

## 2020-10-06 PROCEDURE — 3074F SYST BP LT 130 MM HG: CPT | Mod: CPTII,S$GLB,, | Performed by: NURSE PRACTITIONER

## 2020-10-06 PROCEDURE — 99999 PR PBB SHADOW E&M-EST. PATIENT-LVL V: CPT | Mod: PBBFAC,,, | Performed by: NURSE PRACTITIONER

## 2020-10-06 PROCEDURE — 99215 PR OFFICE/OUTPT VISIT, EST, LEVL V, 40-54 MIN: ICD-10-PCS | Mod: S$GLB,,, | Performed by: NURSE PRACTITIONER

## 2020-10-06 PROCEDURE — 70450 CT HEAD/BRAIN W/O DYE: CPT | Mod: 26,,, | Performed by: RADIOLOGY

## 2020-10-06 PROCEDURE — 99215 OFFICE O/P EST HI 40 MIN: CPT | Mod: S$GLB,,, | Performed by: NURSE PRACTITIONER

## 2020-10-06 RX ORDER — CYCLOBENZAPRINE HCL 5 MG
5 TABLET ORAL NIGHTLY PRN
Qty: 20 TABLET | Refills: 0 | Status: SHIPPED | OUTPATIENT
Start: 2020-10-06 | End: 2020-10-16

## 2020-10-06 RX ORDER — MELOXICAM 7.5 MG/1
7.5 TABLET ORAL DAILY PRN
Qty: 60 TABLET | Refills: 0 | Status: SHIPPED | OUTPATIENT
Start: 2020-10-06 | End: 2021-12-30

## 2020-10-06 RX ORDER — HYDROCHLOROTHIAZIDE 12.5 MG/1
12.5 TABLET ORAL DAILY
Qty: 30 TABLET | Refills: 2 | Status: SHIPPED | OUTPATIENT
Start: 2020-10-06 | End: 2021-12-09 | Stop reason: SDUPTHER

## 2020-10-06 NOTE — PROGRESS NOTES
______________________________________________________________________    Chief Complaint  Chief Complaint   Patient presents with    Arm Pain     right    Head Injury    Chest Pain       HPI  Josemanuel Perera is a 45 y.o. male with multiple medical diagnoses as listed in the medical history and problem list that presents for right arm, chest pain and head pain due to a fall x 3 days.  This patient is new to me. Last seen in primary care 5/1/2020.      Right arm pain  He sustained a fall rom an 18 mcd[employer] while replacing lights. Activity makes right arm pain worse. He rates the pain a 10/10 during activity. At rest he rates the pain a 7/10.    Chest pain (Not cardiac in nature0   He sustained a from an 18 mcd[address]. Activity makes right arm pain worse. He rates the pain a 10/10 during activity. At rest he rates the pain a 7/10. Pain is not cardiac related. He denies chest palpitations, chest tightness or chest pain with radiating pain to the neck, jaw or left arm.     Head injury with abrasions   He tried to break his fall with his right shoulder but still hit his head on the ground. He denies focal changes, dizziness, vertigo or syncope. He hasnt had a CT before this visit. Denies any nodules or masses to the eliz.    PAST MEDICAL HISTORY:  Past Medical History:   Diagnosis Date    Hypertension 2/26/2015    Insomnia     Kidney stones     h/o kidney stones       PAST SURGICAL HISTORY:  History reviewed. No pertinent surgical history.    SOCIAL HISTORY:  Social History     Socioeconomic History    Marital status:      Spouse name: Not on file    Number of children: Not on file    Years of education: Not on file    Highest education level: Not on file   Occupational History    Not on file   Social Needs    Financial resource strain: Not on file    Food insecurity     Worry: Not on file     Inability: Not on file    Transportation needs     Medical: Not on file     Non-medical: Not on file    Tobacco Use    Smoking status: Never Smoker    Smokeless tobacco: Never Used   Substance and Sexual Activity    Alcohol use: Yes    Drug use: No    Sexual activity: Not on file   Lifestyle    Physical activity     Days per week: Not on file     Minutes per session: Not on file    Stress: Not on file   Relationships    Social connections     Talks on phone: Not on file     Gets together: Not on file     Attends Jain service: Not on file     Active member of club or organization: Not on file     Attends meetings of clubs or organizations: Not on file     Relationship status: Not on file   Other Topics Concern    Not on file   Social History Narrative    Not on file       FAMILY HISTORY:  Family History   Problem Relation Age of Onset    Hypertension Mother        ALLERGIES AND MEDICATIONS: updated and reviewed.  Review of patient's allergies indicates:  No Known Allergies  Current Outpatient Medications   Medication Sig Dispense Refill    amitriptyline (ELAVIL) 10 MG tablet Take 1 tablet (10 mg total) by mouth nightly as needed for Insomnia. 90 tablet 1    cyclobenzaprine (FLEXERIL) 5 MG tablet Take 1 tablet (5 mg total) by mouth nightly as needed for Muscle spasms. 20 tablet 0    hydroCHLOROthiazide (HYDRODIURIL) 12.5 MG Tab Take 1 tablet (12.5 mg total) by mouth once daily. 30 tablet 2    meloxicam (MOBIC) 7.5 MG tablet Take 1 tablet (7.5 mg total) by mouth daily as needed for Pain. 60 tablet 0    naproxen (NAPROSYN) 500 MG tablet Take 1 tablet (500 mg total) by mouth 2 (two) times daily with meals. (Patient not taking: Reported on 10/6/2020) 30 tablet 3    ranitidine (ZANTAC) 150 MG tablet Take 1 tablet (150 mg total) by mouth nightly. 60 tablet 3    sildenafil (VIAGRA) 25 MG tablet Take 1 tablet (25 mg total) by mouth daily as needed for Erectile Dysfunction. 15 tablet 0     No current facility-administered medications for this visit.          ROS  Review of Systems   Constitutional:  "Negative for appetite change, chills, fatigue and fever.   HENT: Negative for congestion, ear pain, postnasal drip, rhinorrhea, sneezing, sore throat and tinnitus.    Respiratory: Negative for cough and shortness of breath.    Cardiovascular: Negative for chest pain and palpitations.   Gastrointestinal: Negative for abdominal pain, constipation, diarrhea, nausea and vomiting.   Genitourinary: Negative for difficulty urinating, dysuria, flank pain, frequency and urgency.   Musculoskeletal: Negative for arthralgias and back pain.   Neurological: Negative for tremors, syncope, speech difficulty, weakness and headaches.           Physical Exam  Vitals:    10/06/20 1314   BP: (!) 128/90   Pulse: 70   Temp: 98.7 °F (37.1 °C)   TempSrc: Oral   SpO2: 97%   Weight: 104.9 kg (231 lb 6 oz)   Height: 5' 10" (1.778 m)    Body mass index is 33.2 kg/m².  Weight: 104.9 kg (231 lb 6 oz)   Height: 5' 10" (177.8 cm)   Physical Exam  Constitutional:       General: He is not in acute distress.     Appearance: Normal appearance. He is well-developed.   HENT:      Head: Normocephalic and atraumatic.      Right Ear: External ear normal.      Left Ear: External ear normal.      Nose: Nose normal.      Mouth/Throat:      Mouth: Mucous membranes are moist.      Pharynx: No oropharyngeal exudate.   Eyes:      Pupils: Pupils are equal, round, and reactive to light.   Neck:      Musculoskeletal: Neck supple.   Cardiovascular:      Rate and Rhythm: Normal rate and regular rhythm.      Pulses: Normal pulses.      Heart sounds: No murmur. No friction rub. No gallop.    Pulmonary:      Effort: Pulmonary effort is normal. No respiratory distress.      Breath sounds: Normal breath sounds. No wheezing.   Abdominal:      General: Bowel sounds are normal. There is no distension.      Palpations: Abdomen is soft. There is no mass.      Tenderness: There is no abdominal tenderness.      Hernia: No hernia is present.   Musculoskeletal:         General: " Tenderness (Patient has full ROM with negative drop can test ) and signs of injury (He is able to suprinate, pronate and abduct and adduct both arms further tahn 90 degress) present. No swelling.   Lymphadenopathy:      Cervical: No cervical adenopathy.   Skin:     General: Skin is warm and dry.   Neurological:      General: No focal deficit present.      Mental Status: He is alert and oriented to person, place, and time. Mental status is at baseline.      Cranial Nerves: No cranial nerve deficit.      Sensory: No sensory deficit.      Motor: No weakness.      Coordination: Coordination normal.      Gait: Gait normal.      Deep Tendon Reflexes: Reflexes normal.   Psychiatric:         Mood and Affect: Mood normal.         Behavior: Behavior normal.           Health Maintenance       Date Due Completion Date    Hepatitis C Screening 1976 ---    HIV Screening 03/04/1991 ---    Influenza Vaccine (1) 08/01/2020 ---    Lipid Panel 06/01/2024 6/1/2019    Override on 3/22/2013: (N/S)    TETANUS VACCINE 05/23/2029 5/23/2019            Assessment & Plan  Problem List Items Addressed This Visit     None      Visit Diagnoses     Arm injury, right, initial encounter    -  Primary  - I advised patient to use Meloxicam daily as needed for shoulder pain  -I advised patient to use Flexeril 5 mg at night, as needed for muscle pain  -I advised patient to hydrate more than usual with water  -Advised patient to follow through with Physical therapy referral    Relevant Medications    meloxicam (MOBIC) 7.5 MG tablet    cyclobenzaprine (FLEXERIL) 5 MG tablet    Other Relevant Orders    Ambulatory referral/consult to Physical/Occupational Therapy    Chest injury, initial encounter      -Patient's chest pain and presentation supports costochondritis   -I advised patient to use Meloxicam daily as needed for pain     Relevant Medications    meloxicam (MOBIC) 7.5 MG tablet    Head trauma, initial encounter      -To rule out hemorrhage or  internal head abnormalities     Relevant Orders    CT Head Without Contrast    Essential hypertension      -Patient has HTN and has been out of his blood pressure since Covid season started    Relevant Medications    hydroCHLOROthiazide (HYDRODIURIL) 12.5 MG Tab            Health Maintenance  1. Denied flu vaccine     Follow-up: Follow up in about 4 weeks (around 11/3/2020), or if symptoms worsen or fail to improve.

## 2020-10-06 NOTE — PATIENT INSTRUCTIONS
Meloxicam capsules  What is this medicine?  MELOXICAM (jesus OX i cam) is a non-steroidal anti-inflammatory drug (NSAID). It is used to reduce swelling and to treat pain. It is used for osteoarthritis.  How should I use this medicine?  Take this medicine by mouth with a full glass of water. Follow the directions on the prescription label. You can take it with or without food. If it upsets your stomach, take it with food. Take your medicine at regular intervals. Do not take it more often than directed. Do not stop taking except on your doctor's advice.  A special MedGuide will be given to you by the pharmacist with each prescription and refill. Be sure to read this information carefully each time.  Talk to your pediatrician regarding the use of this medicine in children. Special care may be needed.  Patients over 65 years old may have a stronger reaction and need a smaller dose.  What side effects may I notice from receiving this medicine?  Side effects that you should report to your doctor or health care professional as soon as possible:  · allergic reactions like skin rash, itching or hives, swelling of the face, lips, or tongue  · nausea, vomiting  · signs and symptoms of a blood clot such as breathing problems; changes in vision; chest pain; severe, sudden headache; pain, swelling, warmth in the leg; trouble speaking; sudden numbness or weakness of the face, arm, or leg  · signs and symptoms of bleeding such as bloody or black, tarry stools; red or dark-brown urine; spitting up blood or brown material that looks like coffee grounds; red spots on the skin; unusual bruising or bleeding from the eye, gums, or nose  · signs and symptoms of liver injury like dark yellow or brown urine; general ill feeling or flu-like symptoms; light-colored stools; loss of appetite; nausea; right upper belly pain; unusually weak or tired; yellowing of the eyes or skin  · signs and symptoms of stroke like changes in vision; confusion;  trouble speaking or understanding; severe headaches; sudden numbness or weakness of the face, arm, or leg; trouble walking; dizziness; loss of balance or coordination  Side effects that usually do not require medical attention (report these to your doctor or health care professional if they continue or are bothersome):  · constipation  · diarrhea  · gas  What may interact with this medicine?  Do not take this medicine with any of the following medications:  · cidofovir  · ketorolac  This medicine may also interact with the following medications:  · aspirin and aspirin-like medicines  · certain medicines for blood pressure, heart disease, irregular heart beat  · certain medicines for depression, anxiety, or psychotic disturbances  · certain medicines that treat or prevent blood clots like warfarin, enoxaparin, dalteparin, apixaban, dabigatran, rivaroxaban  · cyclosporine  · digoxin  · diuretics  · methotrexate  · other NSAIDs, medicines for pain and inflammation, like ibuprofen and naproxen  · pemetrexed  What if I miss a dose?  If you miss a dose, take it as soon as you can. If it is almost time for your next dose, take only that dose. Do not take double or extra doses.  Where should I keep my medicine?  Keep out of the reach of children.  Store at room temperature between 15 and 30 degrees C (59 and 86 degrees F). Throw away any unused medicine after the expiration date.  What should I tell my health care provider before I take this medicine?  They need to know if you have any of these conditions:  · bleeding disorders  · cigarette smoker  · coronary artery bypass graft (CABG) surgery within the past 2 weeks  · drink more than 3 alcohol-containing drinks per day  · heart disease  · high blood pressure  · history of stomach bleeding  · kidney disease  · liver disease  · lung or breathing disease, like asthma  · stomach or intestine problems  · an unusual or allergic reaction to meloxicam, aspirin, other NSAIDs,  other medicines, foods, dyes, or preservatives  · pregnant or trying to get pregnant  · breast-feeding  What should I watch for while using this medicine?  Tell your doctor or healthcare professional if your symptoms do not start to get better or if they get worse.  Do not take other medicines that contain aspirin, ibuprofen, or naproxen with this medicine. Side effects such as stomach upset, nausea, or ulcers may be more likely to occur. Many medicines available without a prescription should not be taken with this medicine.  This medicine can cause ulcers and bleeding in the stomach and intestines at any time during treatment. This can happen with no warning and may cause death. There is increased risk with taking this medicine for a long time. Smoking, drinking alcohol, older age, and poor health can also increase risks. Call your doctor right away if you have stomach pain or blood in your vomit or stool.  This medicine does not prevent heart attack or stroke. In fact, this medicine may increase the chance of a heart attack or stroke. The chance may increase with longer use of this medicine and in people who have heart disease. If you take aspirin to prevent heart attack or stroke, talk with your doctor or health care professional.  NOTE:This sheet is a summary. It may not cover all possible information. If you have questions about this medicine, talk to your doctor, pharmacist, or health care provider. Copyright© 2017 [employer]        Cyclobenzaprine tablets  What is this medicine?  CYCLOBENZAPRINE (sye kloe CATHI za preen) is a muscle relaxer. It is used to treat muscle pain, spasms, and stiffness.  How should I use this medicine?  Take this medicine by mouth with a glass of water. Follow the directions on the prescription label. If this medicine upsets your stomach, take it with food or milk. Take your medicine at regular intervals. Do not take it more often than directed.  Talk to your pediatrician regarding  the use of this medicine in children. Special care may be needed.  What side effects may I notice from receiving this medicine?  Side effects that you should report to your doctor or health care professional as soon as possible:  · allergic reactions like skin rash, itching or hives, swelling of the face, lips, or tongue  · breathing problems  · chest pain  · fast, irregular heartbeat  · hallucinations  · seizures  · unusually weak or tired  Side effects that usually do not require medical attention (report to your doctor or health care professional if they continue or are bothersome):  · headache  · nausea, vomiting  What may interact with this medicine?  Do not take this medicine with any of the following medications:  · certain medicines for fungal infections like fluconazole, itraconazole, ketoconazole, posaconazole, voriconazole  · cisapride  · dofetilide  · dronedarone  · halofantrine  · levomethadyl  · MAOIs like Carbex, Eldepryl, Marplan, Nardil, and Parnate  · narcotic medicines for cough  · pimozide  · thioridazine  · ziprasidone  This medicine may also interact with the following medications:  · alcohol  · antihistamines for allergy, cough and cold  · certain medicines for anxiety or sleep  · certain medicines for cancer  · certain medicines for depression like amitriptyline, fluoxetine, sertraline  · certain medicines for infection like alfuzosin, chloroquine, clarithromycin, levofloxacin, mefloquine, pentamidine, troleandomycin  · certain medicines for irregular heart beat  · certain medicines for seizures like phenobarbital, primidone  · contrast dyes  · general anesthetics like halothane, isoflurane, methoxyflurane, propofol  · local anesthetics like lidocaine, pramoxine, tetracaine  · medicines that relax muscles for surgery  · narcotic medicines for pain  · other medicines that prolong the QT interval (cause an abnormal heart rhythm)  · phenothiazines like chlorpromazine, mesoridazine,  prochlorperazine  What if I miss a dose?  If you miss a dose, take it as soon as you can. If it is almost time for your next dose, take only that dose. Do not take double or extra doses.  Where should I keep my medicine?  Keep out of the reach of children.  Store at room temperature between 15 and 30 degrees C (59 and 86 degrees F). Keep container tightly closed. Throw away any unused medicine after the expiration date.  What should I tell my health care provider before I take this medicine?  They need to know if you have any of these conditions:  · heart disease, irregular heartbeat, or previous heart attack  · liver disease  · thyroid problem  · an unusual or allergic reaction to cyclobenzaprine, tricyclic antidepressants, lactose, other medicines, foods, dyes, or preservatives  · pregnant or trying to get pregnant  · breast-feeding  What should I watch for while using this medicine?  Tell your doctor or health care professional if your symptoms do not start to get better or if they get worse.  You may get drowsy or dizzy. Do not drive, use machinery, or do anything that needs mental alertness until you know how this medicine affects you. Do not stand or sit up quickly, especially if you are an older patient. This reduces the risk of dizzy or fainting spells. Alcohol may interfere with the effect of this medicine. Avoid alcoholic drinks.  If you are taking another medicine that also causes drowsiness, you may have more side effects. Give your health care provider a list of all medicines you use. Your doctor will tell you how much medicine to take. Do not take more medicine than directed. Call emergency for help if you have problems breathing or unusual sleepiness.  Your mouth may get dry. Chewing sugarless gum or sucking hard candy, and drinking plenty of water may help. Contact your doctor if the problem does not go away or is severe.  NOTE:This sheet is a summary. It may not cover all possible information. If  you have questions about this medicine, talk to your doctor, pharmacist, or health care provider. Copyright© 2017 [employer]

## 2020-10-09 ENCOUNTER — TELEPHONE (OUTPATIENT)
Dept: FAMILY MEDICINE | Facility: CLINIC | Age: 45
End: 2020-10-09

## 2020-10-09 NOTE — TELEPHONE ENCOUNTER
----- Message from Audi Schmidt NP sent at 10/9/2020  7:38 AM CDT -----  Good morning Mr. Team    1. Please notify Mr. Perera that his Head CT showed no acute changes.   2. Please advise him to monitor for signs of dizziness, increase headaches not controlled with ibuprofen or tylenol or neurological  changes     Thanks in Advance     Audi Schmidt NP

## 2020-10-09 NOTE — PROGRESS NOTES
Good morning Mr. Team    1. Please notify Mr. Perera that his Head CT showed no acute changes.   2. Please advise him to monitor for signs of dizziness, increase headaches not controlled with ibuprofen or tylenol or neurological  changes     Thanks in Advance     Audi Schmidt NP

## 2020-10-15 ENCOUNTER — CLINICAL SUPPORT (OUTPATIENT)
Dept: REHABILITATION | Facility: HOSPITAL | Age: 45
End: 2020-10-15
Payer: COMMERCIAL

## 2020-10-15 DIAGNOSIS — R29.898 WEAKNESS OF SHOULDER: ICD-10-CM

## 2020-10-15 DIAGNOSIS — M25.611 STIFFNESS OF RIGHT SHOULDER JOINT: ICD-10-CM

## 2020-10-15 DIAGNOSIS — M25.511 ACUTE PAIN OF RIGHT SHOULDER: ICD-10-CM

## 2020-10-15 DIAGNOSIS — S49.91XA ARM INJURY, RIGHT, INITIAL ENCOUNTER: ICD-10-CM

## 2020-10-15 PROCEDURE — 97161 PT EVAL LOW COMPLEX 20 MIN: CPT | Mod: PN

## 2020-10-15 PROCEDURE — 97110 THERAPEUTIC EXERCISES: CPT | Mod: PN

## 2020-10-15 NOTE — PLAN OF CARE
[facility]  Physical Therapy Initial Evaluation    Name: Josemanuel Perera  Clinic Number: [id number]    Therapy Diagnosis:   Encounter Diagnoses   Name Primary?    Arm injury, right, initial encounter     Acute pain of right shoulder     Stiffness of right shoulder joint     Weakness of shoulder      Physician: Audi Schmidt, NP    Physician Orders: PT Eval and Treat   Medical Diagnosis from Referral: Arm injury, right, initial encounter  Evaluation Date: 10/15/2020  Authorization Period Expiration: 12/31/2020  Plan of Care Expiration: 1-  Visit # / Visits authorized: 1/ 20    Time In: 2:15 pm  Time Out: 3:00 pm  Total Billable Time: 45 minutes    Precautions: Standard    Subjective   Date of onset: 1 weeks ago  History of current condition - Josemanuel reports: He states he fall down on his R shoulder. He also has some R pect pain. He has some cervical pain.     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   Easing Factors: pain medication      Prior Therapy: No   Social History:  lives with their family and lives with their spouse  Occupation:    Prior Level of Function: Independent   Unable to perform: have pain while performing his job. Difficult to elevated arms over head.     Pts goals:     Imaging, none:      Medical History:   Past Medical History:   Diagnosis Date    Hypertension 2/26/2015    Insomnia     Kidney stones     h/o kidney stones       Surgical History:   Josemanuel Perera  has no past surgical history on file.    Medications:   Josemanuel has a current medication list which includes the following prescription(s): amitriptyline,  cyclobenzaprine, hydrochlorothiazide, meloxicam, naproxen, ranitidine, and sildenafil.    Allergies:   Review of patient's allergies indicates:  No Known Allergies       Objective       Observation:     Posture Alignment: slouched posture;   Shoulder rotation at rest:     Sensation: Light touch: intact to light touch    DTR:  Intact UE     GAIT DEVIATIONS: pt  displays     Cervical Range of Motion:    Degrees Pain   Flexion WFL Yes   Extension WFL No     Right Rotation  WFL No     Left Rotation WFL  No      Right Side Bending WFL Yes   Left Side Bending WFL Yes     Cervical Special Tests:  Compression: negative  Spurling's:  negative  ULTT:  negative    Active Range of Motion (degrees):   Shoulder Right Left   Flexion WFL* WFL   Abduction WFL* WFL   ER  WFL* WFL   IR  WFL* WFL   * pain     Upper Extremity Strength  (R) UE  (L) UE    Shoulder flexion: 4-/5 Shoulder flexion: 4+/5   Shoulder Abduction: 4-/5 Shoulder abduction: 4+/5   Shoulder ER 4-/5 Shoulder ER 4+/5   Shoulder IR 4-/5 Shoulder IR 4+/5       Special Tests:    Impingement   Right   Neer's  positive   [place]-Ascencion  positive     Rotator Cuff:     Right   Drop Arm Test  negative   Subscapularis Lift Off Test  negative   ER Lag Sign  negative   IR Lag Sign  negative   Empty Can Test  positive   Full Can Test positive   Scapular Retraction Test positive   Labrum:     Right   Brethren's Test negative   Clunk Test negative       AC Joint/Biceps:     Right   AC Joint Compression Test negative   Speed's test positive       Palpation: increase Posterior Subacromial Space and Bicipital Groove, and pect tendon and muscles pain.         CMS Impairment/Limitation/Restriction for FOTO Upper Arm Survey  Status Limitation G-Code CMS Severity Modifier  Intake 57% 43% Current Status CK - At least 40 percent but less than 60 percent  Predicted 73% 27% Goal Status+ CJ - At least 20 percent but less than 40 percent  D/C Status CK **only report if this is a one time  visit    TREATMENT   Treatment Time In: 2:40 pm  Treatment Time Out: 3:50 pm  Total Treatment time separate from Evaluation: 10 minutes    Pt received therapeutic exercises to develop strength, endurance, ROM and flexibility for 10 minutes including:  Supine shoulder flexion AAROM  Doorway pect stretch   Pendulum flexion/extension       Home Exercises and Patient Education Provided    Education provided:   - Perform HEP 2 times per day    Written Home Exercises Provided: Patient instructed to cont prior HEP.  Exercises were reviewed and t was able to demonstrate them prior to the end of the session.  Pt demonstrated good  understanding of the education provided.     See EMR under Patient Instructions for exercises provided 10-    Assessment   Pt is a 45 male referred to outpatient Physical Therapy with a medical diagnosis of Arm injury, right, initial encounter. Pt presents to therapy with R shoulder and pectoralis major pain. Insidious of pain happen after fall. Pain is localized in the R posterior and anterior shoulder and entire pect major muscles and tendon. Pt demonstrated good R shoulder AROM, but he has stiffness during motion in all planes. During MMT, pt demonstrated increase of R shoulder weakness. Pt was positive for impingement tests. During palpation, increase pain of long head of the biceps, infraspinatus, and pect major muscle and tendon. I believe pt has pain due to traumatic impact of R shoulder and pect major muscles. Pt will benefit from skilled PT services to decrease functional limitations.     Pt prognosis is Excellent.   Pt will benefit from skilled outpatient Physical Therapy to address the deficits stated above and in the chart below, provide pt/family education, and to maximize pt's level of independence.     Plan of care discussed with patient: Yes  Pt's spiritual, cultural and educational needs considered and patient is agreeable to the plan of care and goals as stated below:      Anticipated Barriers for therapy: None     Medical Necessity is demonstrated by the following  History  Co-morbidities and personal factors that may impact the plan of care Co-morbidities:   Not identified     Personal Factors:   no deficits     low   Examination  Body Structures and Functions, activity limitations and participation restrictions that may impact the plan of care Body Regions:   Shoulder and pect     Body Systems:    ROM  strength  transfers  transitions  motor control  motor learning    Participation Restrictions:   Work    Activity limitations:   Learning and applying knowledge  no deficits    General Tasks and Commands  no deficits    Communication  no deficits    Mobility  lifting and carrying objects  fine hand use (grasping/picking up)    Self care  no deficits    Domestic Life  shopping  cooking  doing house work (cleaning house, washing dishes, laundry)    Interactions/Relationships  no deficits    Life Areas  no deficits    Community and Social Life  community life  recreation and leisure         Complexity: low  See FOTO outcome assessment    Clinical Presentation stable and uncomplicated low   Decision Making/ Complexity Score: low     GOALS: Short Term Goals: 4 weeks  1.Report decreased in pain at worse less than  <   / =  5  /10  to increase tolerance for functional mobility. On going  2. Increased R shoulder MMT 1/2 grade to increase tolerance for ADL and work activities. On going  3. Pt to report be conscious of impaired sitting and standing posture daily to decrease pain. On going  4. Pt to tolerate HEP to improve ROM and independence with ADL's. On going  5. Pt will report decrease in R shoulder and pect pain 50% since initial eval to improve quality of life.    Long Term Goals: 8 weeks  1.Report decreased in pain at worse less than  <   / =  3  /10  to increase tolerance for functional mobility  2.  Patient will demonstrate improved overall function per FOTO Survey to CJ = at  least 20% but < 40% impaired, limited or restricted score or less.  3.Increased R shoulder  MMT 1 grade to increase tolerance for ADL and work activities.  4. Pt to report and demonstrate proper posture in standing and sitting to decrease pain  5. Pt to be Independent with HEP to improve ROM and independence with ADL's.  6. Pt to improve range of motion by 75% to allow for improved functional mobility to allow for improvement in IADLs.       Plan   Plan of care Certification: 10/15/2020 to 1-    Outpatient Physical Therapy 2 times weekly for 12 weeks to include the following interventions: Cervical/Lumbar Traction, Manual Therapy, Moist Heat/ Ice, Neuromuscular Re-ed, Patient Education, Therapeutic Activites and Therapeutic Exercise, dry needling      Tristin Prajapati, PT

## 2020-11-03 ENCOUNTER — OFFICE VISIT (OUTPATIENT)
Dept: FAMILY MEDICINE | Facility: CLINIC | Age: 45
End: 2020-11-03
Payer: COMMERCIAL

## 2020-11-03 VITALS
DIASTOLIC BLOOD PRESSURE: 86 MMHG | SYSTOLIC BLOOD PRESSURE: 134 MMHG | OXYGEN SATURATION: 99 % | BODY MASS INDEX: 32.43 KG/M2 | WEIGHT: 226.5 LBS | TEMPERATURE: 98 F | HEART RATE: 63 BPM | HEIGHT: 70 IN

## 2020-11-03 DIAGNOSIS — E66.9 OBESITY, CLASS I, BMI 30-34.9: Chronic | ICD-10-CM

## 2020-11-03 DIAGNOSIS — G56.02 CARPAL TUNNEL SYNDROME, LEFT: ICD-10-CM

## 2020-11-03 DIAGNOSIS — Z00.00 ROUTINE MEDICAL EXAM: Primary | ICD-10-CM

## 2020-11-03 PROCEDURE — 3079F DIAST BP 80-89 MM HG: CPT | Mod: CPTII,S$GLB,, | Performed by: NURSE PRACTITIONER

## 2020-11-03 PROCEDURE — 99999 PR PBB SHADOW E&M-EST. PATIENT-LVL IV: CPT | Mod: PBBFAC,,, | Performed by: NURSE PRACTITIONER

## 2020-11-03 PROCEDURE — 99396 PREV VISIT EST AGE 40-64: CPT | Mod: S$GLB,,, | Performed by: NURSE PRACTITIONER

## 2020-11-03 PROCEDURE — 3008F BODY MASS INDEX DOCD: CPT | Mod: CPTII,S$GLB,, | Performed by: NURSE PRACTITIONER

## 2020-11-03 PROCEDURE — 99396 PR PREVENTIVE VISIT,EST,40-64: ICD-10-PCS | Mod: S$GLB,,, | Performed by: NURSE PRACTITIONER

## 2020-11-03 PROCEDURE — 3075F SYST BP GE 130 - 139MM HG: CPT | Mod: CPTII,S$GLB,, | Performed by: NURSE PRACTITIONER

## 2020-11-03 PROCEDURE — 3079F PR MOST RECENT DIASTOLIC BLOOD PRESSURE 80-89 MM HG: ICD-10-PCS | Mod: CPTII,S$GLB,, | Performed by: NURSE PRACTITIONER

## 2020-11-03 PROCEDURE — 3075F PR MOST RECENT SYSTOLIC BLOOD PRESS GE 130-139MM HG: ICD-10-PCS | Mod: CPTII,S$GLB,, | Performed by: NURSE PRACTITIONER

## 2020-11-03 PROCEDURE — 99999 PR PBB SHADOW E&M-EST. PATIENT-LVL IV: ICD-10-PCS | Mod: PBBFAC,,, | Performed by: NURSE PRACTITIONER

## 2020-11-03 PROCEDURE — 3008F PR BODY MASS INDEX (BMI) DOCUMENTED: ICD-10-PCS | Mod: CPTII,S$GLB,, | Performed by: NURSE PRACTITIONER

## 2020-11-03 RX ORDER — NAPROXEN 500 MG/1
500 TABLET ORAL 2 TIMES DAILY WITH MEALS
Qty: 60 TABLET | Refills: 5 | Status: SHIPPED | OUTPATIENT
Start: 2020-11-03 | End: 2021-12-09 | Stop reason: SDUPTHER

## 2020-11-03 RX ORDER — MELOXICAM 7.5 MG/1
7.5 TABLET ORAL DAILY PRN
Qty: 60 TABLET | Refills: 0 | Status: CANCELLED | OUTPATIENT
Start: 2020-11-03

## 2020-11-03 NOTE — PATIENT INSTRUCTIONS
I will be in touch with your lab results.  Purchase a left carpal tunnel brace and wear as often as possible.

## 2020-11-03 NOTE — PROGRESS NOTES
History of Present Illness   Josemanuel Perera is a 45 y.o. man with medical history as listed below who presents today for routine physical exam. It has been about one year since his last physical. Unfortunately, he did not have labs prior to our visit. He is taking medications as prescribed without perceived SE. He reports feeling much better since his fall about one month ago. Complaints today: He reports left wrist pain with tinging sensation to palm of left hand for about 2 weeks. Symptoms are intermittent. He denies known injury or trauma. He has tried NSAID without relief. He has no additional complaints and is otherwise healthy on today's visit. We will address his HM today. Pertinent negatives as listed in ROS.    Past Medical History:   Diagnosis Date    Hypertension 2/26/2015    Insomnia     Kidney stones     h/o kidney stones       History reviewed. No pertinent surgical history.    Social History     Socioeconomic History    Marital status:      Spouse name: Not on file    Number of children: Not on file    Years of education: Not on file    Highest education level: Not on file   Occupational History    Not on file   Social Needs    Financial resource strain: Not on file    Food insecurity     Worry: Not on file     Inability: Not on file    Transportation needs     Medical: Not on file     Non-medical: Not on file   Tobacco Use    Smoking status: Never Smoker    Smokeless tobacco: Never Used   Substance and Sexual Activity    Alcohol use: Yes    Drug use: No    Sexual activity: Not on file   Lifestyle    Physical activity     Days per week: Not on file     Minutes per session: Not on file    Stress: Not on file   Relationships    Social connections     Talks on phone: Not on file     Gets together: Not on file     Attends Qu[employer] service: Not on file     Active member of club or organization: Not on file     Attends meetings of clubs or organizations: Not on file      "Relationship status: Not on file   Other Topics Concern    Not on file   Social History Narrative    Not on file       Family History   Problem Relation Age of Onset    Hypertension Mother        Review of Systems  Review of Systems   Constitutional: Negative for malaise/fatigue and weight loss.   HENT: Negative for hearing loss and tinnitus.    Eyes: Negative for blurred vision and double vision.   Respiratory: Negative for shortness of breath and wheezing.    Cardiovascular: Negative for chest pain, palpitations, orthopnea and leg swelling.   Gastrointestinal: Negative for blood in stool and melena.   Genitourinary: Negative for flank pain and hematuria.   Musculoskeletal: Positive for joint pain (left wrist). Negative for back pain and neck pain.   Skin: Negative for rash.   Neurological: Positive for tingling (left hand). Negative for dizziness, loss of consciousness and headaches.   Endo/Heme/Allergies: Negative for environmental allergies and polydipsia.   Psychiatric/Behavioral: Negative for depression. The patient is not nervous/anxious and does not have insomnia.      A complete review of systems was otherwise negative.    Physical Exam  /86   Pulse 63   Temp 98 °F (36.7 °C) (Temporal)   Ht 5' 10" (1.778 m)   Wt 102.7 kg (226 lb 8.4 oz)   SpO2 99%   BMI 32.50 kg/m²   General appearance: alert, appears stated age, cooperative and no distress  Head: Normocephalic, without obvious abnormality, atraumatic  Eyes: negative findings: lids and lashes normal, conjunctivae and sclerae normal and pupils equal, round, reactive to light and accomodation  Ears: normal TM's and external ear canals both ears  Nose: Nares normal. Septum midline. Mucosa normal. No drainage or sinus tenderness.  Throat: lips, mucosa, and tongue normal; teeth and gums normal  Neck: no carotid bruit, no JVD, supple, symmetrical, trachea midline and thyroid not enlarged, symmetric, no tenderness/mass/nodules  Back: symmetric, no " curvature. ROM normal. No CVA tenderness.  Lungs: clear to auscultation bilaterally  Heart: regular rate and rhythm, S1, S2 normal, no murmur, click, rub or gallop  Abdomen: soft, non-tender; bowel sounds normal; no masses,  no organomegaly  Extremities: extremities normal, atraumatic, no cyanosis or edema  Pulses: 2+ and symmetric  Skin: Skin color, texture, turgor normal. No rashes or lesions  Lymph nodes: Cervical, supraclavicular, and axillary nodes normal.  Neurologic: Grossly normal   Musculoskeletal: Left wrist exhibits TTP with positive Phalen's test, sensation decreased to palmar aspect of left hand.    Assessment/Plan  Routine medical exam  Age appropriate screening recommendations and HM were discussed and updated.  Discussed importance of heart healthy diet and exercise.  Return as scheduled for labs as below.  Follow-up pending lab results.  -     CBC Auto Differential; Future; Expected date: 11/03/2020  -     Lipid Panel; Future; Expected date: 11/03/2020  -     HIV 1/2 Ag/Ab (4th Gen); Future; Expected date: 11/03/2020  -     Hepatitis C Antibody; Future; Expected date: 11/03/2020    Obesity, Class I, BMI 30-34.9  The patient is asked to make an attempt to improve diet and exercise patterns to aid in medical management of this problem. We specifically discussed cutting calorie intake by 500-1000 calories per day for a goal of a 1-2 pound weight loss per week and recommendations for a mostly plant based diet with limited red meats/refined grains/processed foods/added sugars.    Carpal tunnel syndrome, left  Trial of NSAID with carpal tunnel brace and ice application.  If no improvement, refer to orthopedics.  -     naproxen (NAPROSYN) 500 MG tablet; Take 1 tablet (500 mg total) by mouth 2 (two) times daily with meals.  Dispense: 60 tablet; Refill: 5    Patient has verbalized understanding and is in agreement with plan of care.    Follow up for pending lab results.

## 2020-11-07 ENCOUNTER — LAB VISIT (OUTPATIENT)
Dept: LAB | Facility: HOSPITAL | Age: 45
End: 2020-11-07
Attending: NURSE PRACTITIONER
Payer: COMMERCIAL

## 2020-11-07 DIAGNOSIS — Z00.00 ROUTINE MEDICAL EXAM: ICD-10-CM

## 2020-11-07 DIAGNOSIS — R74.01 TRANSAMINITIS: ICD-10-CM

## 2020-11-07 LAB
ALBUMIN SERPL BCP-MCNC: 3.9 G/DL (ref 3.5–5.2)
ALP SERPL-CCNC: 96 U/L (ref 55–135)
ALT SERPL W/O P-5'-P-CCNC: 26 U/L (ref 10–44)
ANION GAP SERPL CALC-SCNC: 8 MMOL/L (ref 8–16)
AST SERPL-CCNC: 23 U/L (ref 10–40)
BASOPHILS # BLD AUTO: 0.02 K/UL (ref 0–0.2)
BASOPHILS NFR BLD: 0.5 % (ref 0–1.9)
BILIRUB SERPL-MCNC: 0.6 MG/DL (ref 0.1–1)
BUN SERPL-MCNC: 13 MG/DL (ref 6–20)
CALCIUM SERPL-MCNC: 9 MG/DL (ref 8.7–10.5)
CHLORIDE SERPL-SCNC: 107 MMOL/L (ref 95–110)
CHOLEST SERPL-MCNC: 170 MG/DL (ref 120–199)
CHOLEST/HDLC SERPL: 3.1 {RATIO} (ref 2–5)
CO2 SERPL-SCNC: 27 MMOL/L (ref 23–29)
CREAT SERPL-MCNC: 1.2 MG/DL (ref 0.5–1.4)
DIFFERENTIAL METHOD: NORMAL
EOSINOPHIL # BLD AUTO: 0.2 K/UL (ref 0–0.5)
EOSINOPHIL NFR BLD: 4.4 % (ref 0–8)
ERYTHROCYTE [DISTWIDTH] IN BLOOD BY AUTOMATED COUNT: 12 % (ref 11.5–14.5)
EST. GFR  (AFRICAN AMERICAN): >60 ML/MIN/1.73 M^2
EST. GFR  (NON AFRICAN AMERICAN): >60 ML/MIN/1.73 M^2
GLUCOSE SERPL-MCNC: 94 MG/DL (ref 70–110)
HCT VFR BLD AUTO: 44.8 % (ref 40–54)
HDLC SERPL-MCNC: 54 MG/DL (ref 40–75)
HDLC SERPL: 31.8 % (ref 20–50)
HGB BLD-MCNC: 14.4 G/DL (ref 14–18)
IMM GRANULOCYTES # BLD AUTO: 0 K/UL (ref 0–0.04)
IMM GRANULOCYTES NFR BLD AUTO: 0 % (ref 0–0.5)
LDLC SERPL CALC-MCNC: 104.2 MG/DL (ref 63–159)
LYMPHOCYTES # BLD AUTO: 1.6 K/UL (ref 1–4.8)
LYMPHOCYTES NFR BLD: 39.3 % (ref 18–48)
MCH RBC QN AUTO: 29.6 PG (ref 27–31)
MCHC RBC AUTO-ENTMCNC: 32.1 G/DL (ref 32–36)
MCV RBC AUTO: 92 FL (ref 82–98)
MONOCYTES # BLD AUTO: 0.5 K/UL (ref 0.3–1)
MONOCYTES NFR BLD: 11.1 % (ref 4–15)
NEUTROPHILS # BLD AUTO: 1.8 K/UL (ref 1.8–7.7)
NEUTROPHILS NFR BLD: 44.7 % (ref 38–73)
NONHDLC SERPL-MCNC: 116 MG/DL
NRBC BLD-RTO: 0 /100 WBC
PLATELET # BLD AUTO: 210 K/UL (ref 150–350)
PMV BLD AUTO: 11 FL (ref 9.2–12.9)
POTASSIUM SERPL-SCNC: 4.6 MMOL/L (ref 3.5–5.1)
PROT SERPL-MCNC: 7.2 G/DL (ref 6–8.4)
RBC # BLD AUTO: 4.86 M/UL (ref 4.6–6.2)
SODIUM SERPL-SCNC: 142 MMOL/L (ref 136–145)
TRIGL SERPL-MCNC: 59 MG/DL (ref 30–150)
WBC # BLD AUTO: 4.05 K/UL (ref 3.9–12.7)

## 2020-11-07 PROCEDURE — 36415 COLL VENOUS BLD VENIPUNCTURE: CPT | Mod: PO

## 2020-11-07 PROCEDURE — 86703 HIV-1/HIV-2 1 RESULT ANTBDY: CPT

## 2020-11-07 PROCEDURE — 85025 COMPLETE CBC W/AUTO DIFF WBC: CPT

## 2020-11-07 PROCEDURE — 80074 ACUTE HEPATITIS PANEL: CPT

## 2020-11-07 PROCEDURE — 80053 COMPREHEN METABOLIC PANEL: CPT

## 2020-11-07 PROCEDURE — 80061 LIPID PANEL: CPT

## 2020-11-09 LAB
HAV IGM SERPL QL IA: NEGATIVE
HBV CORE IGM SERPL QL IA: NEGATIVE
HBV SURFACE AG SERPL QL IA: NEGATIVE
HCV AB SERPL QL IA: NEGATIVE
HCV AB SERPL QL IA: NEGATIVE
HIV 1+2 AB+HIV1 P24 AG SERPL QL IA: NEGATIVE

## 2020-11-10 ENCOUNTER — TELEPHONE (OUTPATIENT)
Dept: FAMILY MEDICINE | Facility: CLINIC | Age: 45
End: 2020-11-10

## 2020-11-10 NOTE — TELEPHONE ENCOUNTER
Please let the patient know the following regarding his labs: His HIV and hepatitis screen is negative. His cholesterol looks good. His electrolytes, blood counts, kidney function, and blood sugar are normal. His liver enzymes are back in a normal range. Please let me know if he has any questions.    Thanks!  KARI Woods

## 2021-04-15 ENCOUNTER — PATIENT MESSAGE (OUTPATIENT)
Dept: RESEARCH | Facility: HOSPITAL | Age: 46
End: 2021-04-15

## 2021-07-29 ENCOUNTER — TELEPHONE (OUTPATIENT)
Dept: FAMILY MEDICINE | Facility: CLINIC | Age: 46
End: 2021-07-29

## 2021-07-30 ENCOUNTER — TELEPHONE (OUTPATIENT)
Dept: FAMILY MEDICINE | Facility: CLINIC | Age: 46
End: 2021-07-30

## 2021-09-08 ENCOUNTER — IMMUNIZATION (OUTPATIENT)
Dept: OBSTETRICS AND GYNECOLOGY | Facility: CLINIC | Age: 46
End: 2021-09-08
Payer: COMMERCIAL

## 2021-09-08 ENCOUNTER — OFFICE VISIT (OUTPATIENT)
Dept: FAMILY MEDICINE | Facility: CLINIC | Age: 46
End: 2021-09-08
Payer: COMMERCIAL

## 2021-09-08 VITALS
BODY MASS INDEX: 31.04 KG/M2 | HEIGHT: 70 IN | SYSTOLIC BLOOD PRESSURE: 110 MMHG | HEART RATE: 67 BPM | DIASTOLIC BLOOD PRESSURE: 64 MMHG | OXYGEN SATURATION: 96 % | WEIGHT: 216.81 LBS | TEMPERATURE: 98 F

## 2021-09-08 DIAGNOSIS — N52.9 MALE ERECTILE DISORDER: ICD-10-CM

## 2021-09-08 DIAGNOSIS — M25.512 CHRONIC LEFT SHOULDER PAIN: Primary | ICD-10-CM

## 2021-09-08 DIAGNOSIS — Z23 NEED FOR VACCINATION: Primary | ICD-10-CM

## 2021-09-08 DIAGNOSIS — G47.09 OTHER INSOMNIA: ICD-10-CM

## 2021-09-08 DIAGNOSIS — I10 ESSENTIAL HYPERTENSION: ICD-10-CM

## 2021-09-08 DIAGNOSIS — Z00.00 ROUTINE MEDICAL EXAM: ICD-10-CM

## 2021-09-08 DIAGNOSIS — K21.9 GASTROESOPHAGEAL REFLUX DISEASE WITHOUT ESOPHAGITIS: ICD-10-CM

## 2021-09-08 DIAGNOSIS — E66.9 OBESITY, CLASS I, BMI 30-34.9: Chronic | ICD-10-CM

## 2021-09-08 DIAGNOSIS — G89.29 CHRONIC LEFT SHOULDER PAIN: Primary | ICD-10-CM

## 2021-09-08 PROCEDURE — 3078F DIAST BP <80 MM HG: CPT | Mod: CPTII,S$GLB,, | Performed by: NURSE PRACTITIONER

## 2021-09-08 PROCEDURE — 1159F MED LIST DOCD IN RCRD: CPT | Mod: CPTII,S$GLB,, | Performed by: NURSE PRACTITIONER

## 2021-09-08 PROCEDURE — 1160F PR REVIEW ALL MEDS BY PRESCRIBER/CLIN PHARMACIST DOCUMENTED: ICD-10-PCS | Mod: CPTII,S$GLB,, | Performed by: NURSE PRACTITIONER

## 2021-09-08 PROCEDURE — 91300 COVID-19, MRNA, LNP-S, PF, 30 MCG/0.3 ML DOSE VACCINE: CPT | Mod: ,,, | Performed by: FAMILY MEDICINE

## 2021-09-08 PROCEDURE — 0001A COVID-19, MRNA, LNP-S, PF, 30 MCG/0.3 ML DOSE VACCINE: CPT | Mod: CV19,,, | Performed by: FAMILY MEDICINE

## 2021-09-08 PROCEDURE — 99214 OFFICE O/P EST MOD 30 MIN: CPT | Mod: S$GLB,,, | Performed by: NURSE PRACTITIONER

## 2021-09-08 PROCEDURE — 3008F BODY MASS INDEX DOCD: CPT | Mod: CPTII,S$GLB,, | Performed by: NURSE PRACTITIONER

## 2021-09-08 PROCEDURE — 3078F PR MOST RECENT DIASTOLIC BLOOD PRESSURE < 80 MM HG: ICD-10-PCS | Mod: CPTII,S$GLB,, | Performed by: NURSE PRACTITIONER

## 2021-09-08 PROCEDURE — 1159F PR MEDICATION LIST DOCUMENTED IN MEDICAL RECORD: ICD-10-PCS | Mod: CPTII,S$GLB,, | Performed by: NURSE PRACTITIONER

## 2021-09-08 PROCEDURE — 3008F PR BODY MASS INDEX (BMI) DOCUMENTED: ICD-10-PCS | Mod: CPTII,S$GLB,, | Performed by: NURSE PRACTITIONER

## 2021-09-08 PROCEDURE — 1160F RVW MEDS BY RX/DR IN RCRD: CPT | Mod: CPTII,S$GLB,, | Performed by: NURSE PRACTITIONER

## 2021-09-08 PROCEDURE — 3074F SYST BP LT 130 MM HG: CPT | Mod: CPTII,S$GLB,, | Performed by: NURSE PRACTITIONER

## 2021-09-08 PROCEDURE — 99999 PR PBB SHADOW E&M-EST. PATIENT-LVL IV: CPT | Mod: PBBFAC,,, | Performed by: NURSE PRACTITIONER

## 2021-09-08 PROCEDURE — 99999 PR PBB SHADOW E&M-EST. PATIENT-LVL IV: ICD-10-PCS | Mod: PBBFAC,,, | Performed by: NURSE PRACTITIONER

## 2021-09-08 PROCEDURE — 3074F PR MOST RECENT SYSTOLIC BLOOD PRESSURE < 130 MM HG: ICD-10-PCS | Mod: CPTII,S$GLB,, | Performed by: NURSE PRACTITIONER

## 2021-09-08 PROCEDURE — 0001A COVID-19, MRNA, LNP-S, PF, 30 MCG/0.3 ML DOSE VACCINE: ICD-10-PCS | Mod: CV19,,, | Performed by: FAMILY MEDICINE

## 2021-09-08 PROCEDURE — 91300 COVID-19, MRNA, LNP-S, PF, 30 MCG/0.3 ML DOSE VACCINE: ICD-10-PCS | Mod: ,,, | Performed by: FAMILY MEDICINE

## 2021-09-08 PROCEDURE — 99214 PR OFFICE/OUTPT VISIT, EST, LEVL IV, 30-39 MIN: ICD-10-PCS | Mod: S$GLB,,, | Performed by: NURSE PRACTITIONER

## 2021-09-08 RX ORDER — MELOXICAM 7.5 MG/1
7.5 TABLET ORAL DAILY PRN
Qty: 60 TABLET | Refills: 0 | Status: CANCELLED | OUTPATIENT
Start: 2021-09-08

## 2021-09-08 RX ORDER — SILDENAFIL 25 MG/1
25 TABLET, FILM COATED ORAL DAILY PRN
Qty: 15 TABLET | Refills: 0 | Status: SHIPPED | OUTPATIENT
Start: 2021-09-08 | End: 2023-03-14 | Stop reason: SDUPTHER

## 2021-09-08 RX ORDER — HYDROCHLOROTHIAZIDE 12.5 MG/1
12.5 TABLET ORAL DAILY
Qty: 90 TABLET | Refills: 0 | Status: CANCELLED | OUTPATIENT
Start: 2021-09-08 | End: 2022-09-08

## 2021-09-08 RX ORDER — NAPROXEN 500 MG/1
500 TABLET ORAL 2 TIMES DAILY WITH MEALS
Qty: 60 TABLET | Refills: 5 | Status: CANCELLED | OUTPATIENT
Start: 2021-09-08

## 2021-09-08 RX ORDER — AMITRIPTYLINE HYDROCHLORIDE 10 MG/1
10 TABLET, FILM COATED ORAL NIGHTLY PRN
Qty: 90 TABLET | Refills: 0 | Status: SHIPPED | OUTPATIENT
Start: 2021-09-08 | End: 2021-12-09

## 2021-09-08 RX ORDER — PANTOPRAZOLE SODIUM 20 MG/1
20 TABLET, DELAYED RELEASE ORAL DAILY
Qty: 30 TABLET | Refills: 2 | Status: SHIPPED | OUTPATIENT
Start: 2021-09-08 | End: 2023-03-14 | Stop reason: SDUPTHER

## 2021-09-20 DIAGNOSIS — M25.512 LEFT SHOULDER PAIN, UNSPECIFIED CHRONICITY: Primary | ICD-10-CM

## 2021-09-20 DIAGNOSIS — M25.511 RIGHT SHOULDER PAIN, UNSPECIFIED CHRONICITY: Primary | ICD-10-CM

## 2021-09-29 ENCOUNTER — IMMUNIZATION (OUTPATIENT)
Dept: OBSTETRICS AND GYNECOLOGY | Facility: CLINIC | Age: 46
End: 2021-09-29
Payer: COMMERCIAL

## 2021-09-29 DIAGNOSIS — Z23 NEED FOR VACCINATION: Primary | ICD-10-CM

## 2021-09-29 PROCEDURE — 91300 COVID-19, MRNA, LNP-S, PF, 30 MCG/0.3 ML DOSE VACCINE: CPT | Mod: PBBFAC | Performed by: FAMILY MEDICINE

## 2021-09-29 PROCEDURE — 0002A COVID-19, MRNA, LNP-S, PF, 30 MCG/0.3 ML DOSE VACCINE: CPT | Mod: PBBFAC | Performed by: FAMILY MEDICINE

## 2021-12-04 ENCOUNTER — LAB VISIT (OUTPATIENT)
Dept: LAB | Facility: HOSPITAL | Age: 46
End: 2021-12-04
Attending: NURSE PRACTITIONER
Payer: COMMERCIAL

## 2021-12-04 DIAGNOSIS — Z00.00 ROUTINE MEDICAL EXAM: ICD-10-CM

## 2021-12-04 LAB
ALBUMIN SERPL BCP-MCNC: 3.7 G/DL (ref 3.5–5.2)
ALP SERPL-CCNC: 145 U/L (ref 55–135)
ALT SERPL W/O P-5'-P-CCNC: 62 U/L (ref 10–44)
ANION GAP SERPL CALC-SCNC: 7 MMOL/L (ref 8–16)
AST SERPL-CCNC: 44 U/L (ref 10–40)
BASOPHILS # BLD AUTO: 0.01 K/UL (ref 0–0.2)
BASOPHILS NFR BLD: 0.3 % (ref 0–1.9)
BILIRUB SERPL-MCNC: 0.8 MG/DL (ref 0.1–1)
BUN SERPL-MCNC: 15 MG/DL (ref 6–20)
CALCIUM SERPL-MCNC: 9.3 MG/DL (ref 8.7–10.5)
CHLORIDE SERPL-SCNC: 105 MMOL/L (ref 95–110)
CHOLEST SERPL-MCNC: 181 MG/DL (ref 120–199)
CHOLEST/HDLC SERPL: 3.5 {RATIO} (ref 2–5)
CO2 SERPL-SCNC: 27 MMOL/L (ref 23–29)
CREAT SERPL-MCNC: 1.2 MG/DL (ref 0.5–1.4)
DIFFERENTIAL METHOD: ABNORMAL
EOSINOPHIL # BLD AUTO: 0.1 K/UL (ref 0–0.5)
EOSINOPHIL NFR BLD: 4.2 % (ref 0–8)
ERYTHROCYTE [DISTWIDTH] IN BLOOD BY AUTOMATED COUNT: 12.4 % (ref 11.5–14.5)
EST. GFR  (AFRICAN AMERICAN): >60 ML/MIN/1.73 M^2
EST. GFR  (NON AFRICAN AMERICAN): >60 ML/MIN/1.73 M^2
GLUCOSE SERPL-MCNC: 90 MG/DL (ref 70–110)
HCT VFR BLD AUTO: 46.2 % (ref 40–54)
HDLC SERPL-MCNC: 52 MG/DL (ref 40–75)
HDLC SERPL: 28.7 % (ref 20–50)
HGB BLD-MCNC: 15.4 G/DL (ref 14–18)
IMM GRANULOCYTES # BLD AUTO: 0.01 K/UL (ref 0–0.04)
IMM GRANULOCYTES NFR BLD AUTO: 0.3 % (ref 0–0.5)
LDLC SERPL CALC-MCNC: 117 MG/DL (ref 63–159)
LYMPHOCYTES # BLD AUTO: 1.2 K/UL (ref 1–4.8)
LYMPHOCYTES NFR BLD: 35 % (ref 18–48)
MCH RBC QN AUTO: 30.1 PG (ref 27–31)
MCHC RBC AUTO-ENTMCNC: 33.3 G/DL (ref 32–36)
MCV RBC AUTO: 90 FL (ref 82–98)
MONOCYTES # BLD AUTO: 0.6 K/UL (ref 0.3–1)
MONOCYTES NFR BLD: 18.6 % (ref 4–15)
NEUTROPHILS # BLD AUTO: 1.4 K/UL (ref 1.8–7.7)
NEUTROPHILS NFR BLD: 41.6 % (ref 38–73)
NONHDLC SERPL-MCNC: 129 MG/DL
NRBC BLD-RTO: 0 /100 WBC
PLATELET # BLD AUTO: 230 K/UL (ref 150–450)
PMV BLD AUTO: 11 FL (ref 9.2–12.9)
POTASSIUM SERPL-SCNC: 4.2 MMOL/L (ref 3.5–5.1)
PROT SERPL-MCNC: 7.2 G/DL (ref 6–8.4)
RBC # BLD AUTO: 5.12 M/UL (ref 4.6–6.2)
SODIUM SERPL-SCNC: 139 MMOL/L (ref 136–145)
TRIGL SERPL-MCNC: 60 MG/DL (ref 30–150)
WBC # BLD AUTO: 3.34 K/UL (ref 3.9–12.7)

## 2021-12-04 PROCEDURE — 36415 COLL VENOUS BLD VENIPUNCTURE: CPT | Mod: PO | Performed by: NURSE PRACTITIONER

## 2021-12-04 PROCEDURE — 80053 COMPREHEN METABOLIC PANEL: CPT | Performed by: NURSE PRACTITIONER

## 2021-12-04 PROCEDURE — 80061 LIPID PANEL: CPT | Performed by: NURSE PRACTITIONER

## 2021-12-04 PROCEDURE — 85025 COMPLETE CBC W/AUTO DIFF WBC: CPT | Performed by: NURSE PRACTITIONER

## 2021-12-09 ENCOUNTER — OFFICE VISIT (OUTPATIENT)
Dept: FAMILY MEDICINE | Facility: CLINIC | Age: 46
End: 2021-12-09
Payer: COMMERCIAL

## 2021-12-09 VITALS
OXYGEN SATURATION: 98 % | HEART RATE: 73 BPM | DIASTOLIC BLOOD PRESSURE: 100 MMHG | SYSTOLIC BLOOD PRESSURE: 140 MMHG | BODY MASS INDEX: 31.43 KG/M2 | TEMPERATURE: 98 F | WEIGHT: 219.56 LBS | HEIGHT: 70 IN

## 2021-12-09 DIAGNOSIS — Z00.00 ROUTINE MEDICAL EXAM: Primary | ICD-10-CM

## 2021-12-09 DIAGNOSIS — R74.01 TRANSAMINITIS: ICD-10-CM

## 2021-12-09 DIAGNOSIS — Z12.5 SCREENING FOR PROSTATE CANCER: ICD-10-CM

## 2021-12-09 DIAGNOSIS — Z23 FLU VACCINE NEED: ICD-10-CM

## 2021-12-09 DIAGNOSIS — I10 ESSENTIAL HYPERTENSION: Chronic | ICD-10-CM

## 2021-12-09 DIAGNOSIS — E66.9 OBESITY, CLASS I, BMI 30-34.9: Chronic | ICD-10-CM

## 2021-12-09 DIAGNOSIS — Z12.11 SCREENING FOR COLON CANCER: ICD-10-CM

## 2021-12-09 PROCEDURE — 90686 IIV4 VACC NO PRSV 0.5 ML IM: CPT | Mod: S$GLB,,, | Performed by: INTERNAL MEDICINE

## 2021-12-09 PROCEDURE — 99999 PR PBB SHADOW E&M-EST. PATIENT-LVL IV: CPT | Mod: PBBFAC,,, | Performed by: INTERNAL MEDICINE

## 2021-12-09 PROCEDURE — 99999 PR PBB SHADOW E&M-EST. PATIENT-LVL IV: ICD-10-PCS | Mod: PBBFAC,,, | Performed by: INTERNAL MEDICINE

## 2021-12-09 PROCEDURE — 99396 PR PREVENTIVE VISIT,EST,40-64: ICD-10-PCS | Mod: 25,S$GLB,, | Performed by: INTERNAL MEDICINE

## 2021-12-09 PROCEDURE — 90471 FLU VACCINE (QUAD) GREATER THAN OR EQUAL TO 3YO PRESERVATIVE FREE IM: ICD-10-PCS | Mod: S$GLB,,, | Performed by: INTERNAL MEDICINE

## 2021-12-09 PROCEDURE — 90471 IMMUNIZATION ADMIN: CPT | Mod: S$GLB,,, | Performed by: INTERNAL MEDICINE

## 2021-12-09 PROCEDURE — 90686 FLU VACCINE (QUAD) GREATER THAN OR EQUAL TO 3YO PRESERVATIVE FREE IM: ICD-10-PCS | Mod: S$GLB,,, | Performed by: INTERNAL MEDICINE

## 2021-12-09 PROCEDURE — 99396 PREV VISIT EST AGE 40-64: CPT | Mod: 25,S$GLB,, | Performed by: INTERNAL MEDICINE

## 2021-12-09 RX ORDER — HYDROCHLOROTHIAZIDE 12.5 MG/1
12.5 TABLET ORAL DAILY
Qty: 90 TABLET | Refills: 3 | Status: SHIPPED | OUTPATIENT
Start: 2021-12-09 | End: 2023-03-14 | Stop reason: SDUPTHER

## 2021-12-15 ENCOUNTER — PATIENT MESSAGE (OUTPATIENT)
Dept: ADMINISTRATIVE | Facility: HOSPITAL | Age: 46
End: 2021-12-15
Payer: COMMERCIAL

## 2021-12-17 ENCOUNTER — HOSPITAL ENCOUNTER (OUTPATIENT)
Dept: RADIOLOGY | Facility: HOSPITAL | Age: 46
Discharge: HOME | End: 2021-12-17
Attending: INTERNAL MEDICINE
Payer: COMMERCIAL

## 2021-12-17 DIAGNOSIS — R74.01 TRANSAMINITIS: ICD-10-CM

## 2021-12-17 PROCEDURE — 76705 ECHO EXAM OF ABDOMEN: CPT | Mod: 26,,, | Performed by: RADIOLOGY

## 2021-12-17 PROCEDURE — 76705 ECHO EXAM OF ABDOMEN: CPT | Mod: TC

## 2021-12-17 PROCEDURE — 76705 US ABDOMEN LIMITED: ICD-10-PCS | Mod: 26,,, | Performed by: RADIOLOGY

## 2021-12-21 ENCOUNTER — HOSPITAL ENCOUNTER (OUTPATIENT)
Dept: RADIOLOGY | Facility: HOSPITAL | Age: 46
Discharge: HOME | End: 2021-12-21
Attending: ORTHOPAEDIC SURGERY
Payer: COMMERCIAL

## 2021-12-21 DIAGNOSIS — M25.512 LEFT SHOULDER PAIN, UNSPECIFIED CHRONICITY: ICD-10-CM

## 2021-12-21 DIAGNOSIS — M25.512 LEFT SHOULDER PAIN, UNSPECIFIED CHRONICITY: Primary | ICD-10-CM

## 2021-12-21 PROCEDURE — 73030 X-RAY EXAM OF SHOULDER: CPT | Mod: 26,LT,, | Performed by: RADIOLOGY

## 2021-12-21 PROCEDURE — 73030 X-RAY EXAM OF SHOULDER: CPT | Mod: TC,FY,PO,LT

## 2021-12-21 PROCEDURE — 73030 XR SHOULDER TRAUMA 3 VIEW LEFT: ICD-10-PCS | Mod: 26,LT,, | Performed by: RADIOLOGY

## 2021-12-30 ENCOUNTER — OFFICE VISIT (OUTPATIENT)
Dept: ORTHOPEDICS | Facility: CLINIC | Age: 46
End: 2021-12-30
Payer: COMMERCIAL

## 2021-12-30 VITALS
HEART RATE: 68 BPM | OXYGEN SATURATION: 97 % | RESPIRATION RATE: 18 BRPM | SYSTOLIC BLOOD PRESSURE: 100 MMHG | DIASTOLIC BLOOD PRESSURE: 60 MMHG | BODY MASS INDEX: 31.66 KG/M2 | WEIGHT: 221.13 LBS | HEIGHT: 70 IN

## 2021-12-30 DIAGNOSIS — G89.29 CHRONIC LEFT SHOULDER PAIN: ICD-10-CM

## 2021-12-30 DIAGNOSIS — M67.912 ROTATOR CUFF DYSFUNCTION, LEFT: Primary | ICD-10-CM

## 2021-12-30 DIAGNOSIS — M25.512 CHRONIC LEFT SHOULDER PAIN: ICD-10-CM

## 2021-12-30 PROCEDURE — 99999 PR PBB SHADOW E&M-EST. PATIENT-LVL III: ICD-10-PCS | Mod: PBBFAC,,, | Performed by: ORTHOPAEDIC SURGERY

## 2021-12-30 PROCEDURE — 1160F PR REVIEW ALL MEDS BY PRESCRIBER/CLIN PHARMACIST DOCUMENTED: ICD-10-PCS | Mod: CPTII,S$GLB,, | Performed by: ORTHOPAEDIC SURGERY

## 2021-12-30 PROCEDURE — 99204 PR OFFICE/OUTPT VISIT, NEW, LEVL IV, 45-59 MIN: ICD-10-PCS | Mod: S$GLB,,, | Performed by: ORTHOPAEDIC SURGERY

## 2021-12-30 PROCEDURE — 1159F MED LIST DOCD IN RCRD: CPT | Mod: CPTII,S$GLB,, | Performed by: ORTHOPAEDIC SURGERY

## 2021-12-30 PROCEDURE — 1160F RVW MEDS BY RX/DR IN RCRD: CPT | Mod: CPTII,S$GLB,, | Performed by: ORTHOPAEDIC SURGERY

## 2021-12-30 PROCEDURE — 3078F PR MOST RECENT DIASTOLIC BLOOD PRESSURE < 80 MM HG: ICD-10-PCS | Mod: CPTII,S$GLB,, | Performed by: ORTHOPAEDIC SURGERY

## 2021-12-30 PROCEDURE — 99204 OFFICE O/P NEW MOD 45 MIN: CPT | Mod: S$GLB,,, | Performed by: ORTHOPAEDIC SURGERY

## 2021-12-30 PROCEDURE — 1159F PR MEDICATION LIST DOCUMENTED IN MEDICAL RECORD: ICD-10-PCS | Mod: CPTII,S$GLB,, | Performed by: ORTHOPAEDIC SURGERY

## 2021-12-30 PROCEDURE — 3078F DIAST BP <80 MM HG: CPT | Mod: CPTII,S$GLB,, | Performed by: ORTHOPAEDIC SURGERY

## 2021-12-30 PROCEDURE — 3074F PR MOST RECENT SYSTOLIC BLOOD PRESSURE < 130 MM HG: ICD-10-PCS | Mod: CPTII,S$GLB,, | Performed by: ORTHOPAEDIC SURGERY

## 2021-12-30 PROCEDURE — 3008F PR BODY MASS INDEX (BMI) DOCUMENTED: ICD-10-PCS | Mod: CPTII,S$GLB,, | Performed by: ORTHOPAEDIC SURGERY

## 2021-12-30 PROCEDURE — 3008F BODY MASS INDEX DOCD: CPT | Mod: CPTII,S$GLB,, | Performed by: ORTHOPAEDIC SURGERY

## 2021-12-30 PROCEDURE — 3074F SYST BP LT 130 MM HG: CPT | Mod: CPTII,S$GLB,, | Performed by: ORTHOPAEDIC SURGERY

## 2021-12-30 PROCEDURE — 99999 PR PBB SHADOW E&M-EST. PATIENT-LVL III: CPT | Mod: PBBFAC,,, | Performed by: ORTHOPAEDIC SURGERY

## 2021-12-30 RX ORDER — MELOXICAM 15 MG/1
15 TABLET ORAL DAILY
Qty: 30 TABLET | Refills: 0 | Status: SHIPPED | OUTPATIENT
Start: 2021-12-30 | End: 2023-03-14 | Stop reason: SDUPTHER

## 2022-01-06 ENCOUNTER — PATIENT OUTREACH (OUTPATIENT)
Dept: ADMINISTRATIVE | Facility: OTHER | Age: 47
End: 2022-01-06
Payer: COMMERCIAL

## 2022-01-06 DIAGNOSIS — Z12.11 COLON CANCER SCREENING: Primary | ICD-10-CM

## 2022-01-08 ENCOUNTER — HOSPITAL ENCOUNTER (OUTPATIENT)
Dept: RADIOLOGY | Facility: HOSPITAL | Age: 47
Discharge: HOME | End: 2022-01-08
Attending: ORTHOPAEDIC SURGERY
Payer: COMMERCIAL

## 2022-01-08 DIAGNOSIS — M25.512 CHRONIC LEFT SHOULDER PAIN: ICD-10-CM

## 2022-01-08 DIAGNOSIS — G89.29 CHRONIC LEFT SHOULDER PAIN: ICD-10-CM

## 2022-01-08 PROCEDURE — 73221 MRI JOINT UPR EXTREM W/O DYE: CPT | Mod: TC,LT

## 2022-01-08 PROCEDURE — 73221 MRI SHOULDER WITHOUT CONTRAST LEFT: ICD-10-PCS | Mod: 26,LT,, | Performed by: RADIOLOGY

## 2022-01-08 PROCEDURE — 73221 MRI JOINT UPR EXTREM W/O DYE: CPT | Mod: 26,LT,, | Performed by: RADIOLOGY

## 2022-01-12 ENCOUNTER — OFFICE VISIT (OUTPATIENT)
Dept: UROLOGY | Facility: CLINIC | Age: 47
End: 2022-01-12
Payer: COMMERCIAL

## 2022-01-12 VITALS — WEIGHT: 225 LBS | RESPIRATION RATE: 18 BRPM | HEIGHT: 70 IN | BODY MASS INDEX: 32.21 KG/M2

## 2022-01-12 DIAGNOSIS — N53.14 RETROGRADE EJACULATION: Primary | ICD-10-CM

## 2022-01-12 DIAGNOSIS — R35.1 NOCTURIA: ICD-10-CM

## 2022-01-12 DIAGNOSIS — N40.0 BPH WITHOUT URINARY OBSTRUCTION: ICD-10-CM

## 2022-01-12 PROCEDURE — 1159F MED LIST DOCD IN RCRD: CPT | Mod: CPTII,S$GLB,, | Performed by: UROLOGY

## 2022-01-12 PROCEDURE — 3008F PR BODY MASS INDEX (BMI) DOCUMENTED: ICD-10-PCS | Mod: CPTII,S$GLB,, | Performed by: UROLOGY

## 2022-01-12 PROCEDURE — 81001 PR  URINALYSIS, AUTO, W/SCOPE: ICD-10-PCS | Mod: S$GLB,,, | Performed by: UROLOGY

## 2022-01-12 PROCEDURE — 99999 PR PBB SHADOW E&M-EST. PATIENT-LVL III: CPT | Mod: PBBFAC,,, | Performed by: UROLOGY

## 2022-01-12 PROCEDURE — 1160F PR REVIEW ALL MEDS BY PRESCRIBER/CLIN PHARMACIST DOCUMENTED: ICD-10-PCS | Mod: CPTII,S$GLB,, | Performed by: UROLOGY

## 2022-01-12 PROCEDURE — 3008F BODY MASS INDEX DOCD: CPT | Mod: CPTII,S$GLB,, | Performed by: UROLOGY

## 2022-01-12 PROCEDURE — 1159F PR MEDICATION LIST DOCUMENTED IN MEDICAL RECORD: ICD-10-PCS | Mod: CPTII,S$GLB,, | Performed by: UROLOGY

## 2022-01-12 PROCEDURE — 99999 PR PBB SHADOW E&M-EST. PATIENT-LVL III: ICD-10-PCS | Mod: PBBFAC,,, | Performed by: UROLOGY

## 2022-01-12 PROCEDURE — 99204 OFFICE O/P NEW MOD 45 MIN: CPT | Mod: S$GLB,,, | Performed by: UROLOGY

## 2022-01-12 PROCEDURE — 81001 URINALYSIS AUTO W/SCOPE: CPT | Mod: S$GLB,,, | Performed by: UROLOGY

## 2022-01-12 PROCEDURE — 1160F RVW MEDS BY RX/DR IN RCRD: CPT | Mod: CPTII,S$GLB,, | Performed by: UROLOGY

## 2022-01-12 PROCEDURE — 99204 PR OFFICE/OUTPT VISIT, NEW, LEVL IV, 45-59 MIN: ICD-10-PCS | Mod: S$GLB,,, | Performed by: UROLOGY

## 2022-01-12 RX ORDER — PSEUDOEPHEDRINE HCL 30 MG
30 TABLET ORAL EVERY 4 HOURS PRN
Qty: 30 TABLET | Refills: 0 | Status: SHIPPED | OUTPATIENT
Start: 2022-01-12 | End: 2023-03-14 | Stop reason: SDUPTHER

## 2022-01-12 NOTE — PROGRESS NOTES
Subjective:       Patient ID: Brad Perera is a 46 y.o. male who was referred by Self, Aaareferral    Chief Complaint:   Chief Complaint   Patient presents with    Other     Patient states his semen does not come out when he ejaculates. He states is been about a year and a half     Difficulty Ejaculating  He has had issues for the past 1.5 years with no semen coming out during orgasm.  He denies any trauma or STD.  He does not take any alpha blockers.  He denies issues with orgasm but no semen.  He denies erection issues.    Benign Prostatic Hypertrophy  Patient complains of lower urinary tract symptoms. He reports frequency, nocturia four times a night and urgency. He denies weak stream. Patient states symptoms are of moderate severity. Onset of symptoms was several years ago and was gradual in onset. His AUA Symptom Score is, 10/3 manifested as irritative symptoms including frequency, nocturia. He has no personal history and no family history of prostate cancer. He reports a history of no complicating symptoms. He denies flank pain, gross hematuria, kidney stones and recurrent UTI.           ACTIVE MEDICAL ISSUES:  Patient Active Problem List   Diagnosis    Other insomnia    Obesity, Class I, BMI 30-34.9    Acute pain of right shoulder    Stiffness of right shoulder joint    Weakness of shoulder    Essential hypertension       PAST MEDICAL HISTORY  Past Medical History:   Diagnosis Date    Hypertension 2/26/2015    Insomnia     Kidney stones     h/o kidney stones       PAST SURGICAL HISTORY:  No past surgical history on file.    SOCIAL HISTORY:  Social History     Tobacco Use    Smoking status: Never Smoker    Smokeless tobacco: Never Used   Substance Use Topics    Alcohol use: Yes    Drug use: No       FAMILY HISTORY:  Family History   Problem Relation Age of Onset    Hypertension Mother        ALLERGIES AND MEDICATIONS: updated and reviewed.  Review of patient's allergies indicates:  No  "Known Allergies  Current Outpatient Medications   Medication Sig    amitriptyline (ELAVIL) 10 MG tablet TAKE 1 TABLET(10 MG) BY MOUTH EVERY NIGHT AS NEEDED FOR INSOMNIA    hydroCHLOROthiazide (HYDRODIURIL) 12.5 MG Tab Take 1 tablet (12.5 mg total) by mouth once daily.    meloxicam (MOBIC) 15 MG tablet Take 1 tablet (15 mg total) by mouth once daily.    pantoprazole (PROTONIX) 20 MG tablet Take 1 tablet (20 mg total) by mouth once daily.    sildenafiL (VIAGRA) 25 MG tablet Take 1 tablet (25 mg total) by mouth daily as needed for Erectile Dysfunction.    pseudoephedrine (SUDAFED) 30 MG tablet Take 1 tablet (30 mg total) by mouth every 4 (four) hours as needed for Congestion.     No current facility-administered medications for this visit.       Review of Systems   Constitutional: Negative for activity change, fatigue, fever and unexpected weight change.   HENT: Negative for congestion.    Eyes: Negative for redness.   Respiratory: Negative for chest tightness and shortness of breath.    Cardiovascular: Negative for chest pain and leg swelling.   Gastrointestinal: Negative for abdominal pain, constipation, diarrhea, nausea and vomiting.   Genitourinary: Negative for dysuria, flank pain, frequency, hematuria, penile pain, penile swelling, scrotal swelling, testicular pain and urgency.   Musculoskeletal: Negative for arthralgias and back pain.   Neurological: Negative for dizziness and light-headedness.   Psychiatric/Behavioral: Negative for behavioral problems and confusion. The patient is not nervous/anxious.    All other systems reviewed and are negative.      Objective:      Vitals:    01/12/22 1459   Resp: 18   Weight: 102 kg (224 lb 15.7 oz)   Height: 5' 10" (1.778 m)     Physical Exam  Vitals and nursing note reviewed.   Constitutional:       Appearance: He is well-developed and well-nourished.   HENT:      Head: Normocephalic.   Eyes:      Conjunctiva/sclera: Conjunctivae normal.   Neck:      Thyroid: No " thyromegaly.      Trachea: No tracheal deviation.   Cardiovascular:      Rate and Rhythm: Normal rate.      Heart sounds: Normal heart sounds.   Pulmonary:      Effort: Pulmonary effort is normal. No respiratory distress.      Breath sounds: Normal breath sounds. No wheezing.   Abdominal:      General: Bowel sounds are normal.      Palpations: Abdomen is soft. There is no hepatosplenomegaly.      Tenderness: There is no abdominal tenderness. There is no CVA tenderness or rebound.      Hernia: No hernia is present.   Genitourinary:     Comments: Patient refused  Musculoskeletal:         General: No tenderness or edema. Normal range of motion.      Cervical back: Normal range of motion and neck supple.   Lymphadenopathy:      Cervical: No cervical adenopathy.   Skin:     General: Skin is warm and dry.      Findings: No erythema or rash.   Neurological:      Mental Status: He is alert and oriented to person, place, and time.   Psychiatric:         Mood and Affect: Mood and affect normal.         Behavior: Behavior normal.         Thought Content: Thought content normal.         Judgment: Judgment normal.         Urine dipstick shows negative for all components.  Micro exam: negative for WBC's or RBC's.    Assessment:       1. Retrograde ejaculation    2. Nocturia    3. BPH without urinary obstruction          Plan:       1. Retrograde ejaculation  May need further evaluation with a cystoscopy or TRUS to rule out obstruction    - pseudoephedrine (SUDAFED) 30 MG tablet; Take 1 tablet (30 mg total) by mouth every 4 (four) hours as needed for Congestion.  Dispense: 30 tablet; Refill: 0    2. Nocturia  Limit evening fluids  Alpha blockers would worsen his retrograde ejaculation    - US Retroperitoneal Complete (Kidney and; Future    3. BPH without urinary obstruction    - POCT urinalysis, dipstick or tablet reag  - Prostate Specific Antigen, Diagnostic; Future            Follow up in about 3 months (around 4/12/2022) for  Review PSA.

## 2022-02-12 ENCOUNTER — LAB VISIT (OUTPATIENT)
Dept: LAB | Facility: HOSPITAL | Age: 47
End: 2022-02-12
Attending: INTERNAL MEDICINE
Payer: COMMERCIAL

## 2022-02-12 DIAGNOSIS — Z12.5 SCREENING FOR PROSTATE CANCER: ICD-10-CM

## 2022-02-12 DIAGNOSIS — R74.01 TRANSAMINITIS: ICD-10-CM

## 2022-02-12 LAB
ALBUMIN SERPL BCP-MCNC: 3.7 G/DL (ref 3.5–5.2)
ALP SERPL-CCNC: 158 U/L (ref 55–135)
ALT SERPL W/O P-5'-P-CCNC: 49 U/L (ref 10–44)
AST SERPL-CCNC: 33 U/L (ref 10–40)
BILIRUB DIRECT SERPL-MCNC: 0.3 MG/DL (ref 0.1–0.3)
BILIRUB SERPL-MCNC: 0.7 MG/DL (ref 0.1–1)
COMPLEXED PSA SERPL-MCNC: 0.4 NG/ML (ref 0–4)
GGT SERPL-CCNC: 455 U/L (ref 8–55)
PROT SERPL-MCNC: 7.3 G/DL (ref 6–8.4)

## 2022-02-12 PROCEDURE — 80076 HEPATIC FUNCTION PANEL: CPT | Performed by: INTERNAL MEDICINE

## 2022-02-12 PROCEDURE — 84153 ASSAY OF PSA TOTAL: CPT | Performed by: INTERNAL MEDICINE

## 2022-02-12 PROCEDURE — 36415 COLL VENOUS BLD VENIPUNCTURE: CPT | Mod: PO | Performed by: INTERNAL MEDICINE

## 2022-02-12 PROCEDURE — 82977 ASSAY OF GGT: CPT | Performed by: INTERNAL MEDICINE

## 2022-02-14 NOTE — PROGRESS NOTES
Your lab results are improving.  How are things going with the lifestyle changes?    Sincerely,  Mark Anthony Hogan  [email or web address]

## 2022-03-06 DIAGNOSIS — G47.09 OTHER INSOMNIA: ICD-10-CM

## 2022-03-06 NOTE — TELEPHONE ENCOUNTER
No new care gaps identified.  Powered by [employer]. Reference number: [id number].   3/06/2022 8:19:01 AM CST

## 2022-03-09 ENCOUNTER — PATIENT MESSAGE (OUTPATIENT)
Dept: FAMILY MEDICINE | Facility: CLINIC | Age: 47
End: 2022-03-09
Payer: COMMERCIAL

## 2022-03-10 RX ORDER — AMITRIPTYLINE HYDROCHLORIDE 10 MG/1
TABLET, FILM COATED ORAL
Qty: 90 TABLET | Refills: 1 | Status: SHIPPED | OUTPATIENT
Start: 2022-03-10 | End: 2023-03-14 | Stop reason: SDUPTHER

## 2022-03-10 NOTE — TELEPHONE ENCOUNTER
This Rx Request does not qualify for assessment with the ORC   Please review protocol details and the Care Due Message for extra clinical information    Reasons Rx Request may be deferred:  Labs/Vitals overdue  Labs/Vitals abnormal  Patient has been seen in the ED/Hospital since the last PCP visit  Medication is non-delegated  Provider is a non-participating provider  Medication diagnosis association not within scope for ORC    Note composed:9:53 PM 03/09/2022

## 2022-03-18 ENCOUNTER — TELEPHONE (OUTPATIENT)
Dept: UROLOGY | Facility: CLINIC | Age: 47
End: 2022-03-18
Payer: COMMERCIAL

## 2022-04-22 ENCOUNTER — PATIENT OUTREACH (OUTPATIENT)
Dept: ADMINISTRATIVE | Facility: OTHER | Age: 47
End: 2022-04-22
Payer: COMMERCIAL

## 2022-06-01 ENCOUNTER — PATIENT MESSAGE (OUTPATIENT)
Dept: ADMINISTRATIVE | Facility: HOSPITAL | Age: 47
End: 2022-06-01
Payer: COMMERCIAL

## 2022-06-21 ENCOUNTER — PATIENT MESSAGE (OUTPATIENT)
Dept: FAMILY MEDICINE | Facility: CLINIC | Age: 47
End: 2022-06-21
Payer: COMMERCIAL

## 2022-09-13 ENCOUNTER — PATIENT MESSAGE (OUTPATIENT)
Dept: FAMILY MEDICINE | Facility: CLINIC | Age: 47
End: 2022-09-13
Payer: COMMERCIAL

## 2023-03-14 ENCOUNTER — OFFICE VISIT (OUTPATIENT)
Dept: FAMILY MEDICINE | Facility: CLINIC | Age: 48
End: 2023-03-14
Payer: COMMERCIAL

## 2023-03-14 VITALS
HEIGHT: 70 IN | DIASTOLIC BLOOD PRESSURE: 86 MMHG | OXYGEN SATURATION: 96 % | BODY MASS INDEX: 32.67 KG/M2 | TEMPERATURE: 99 F | HEART RATE: 73 BPM | WEIGHT: 228.19 LBS | SYSTOLIC BLOOD PRESSURE: 138 MMHG

## 2023-03-14 DIAGNOSIS — N53.14 RETROGRADE EJACULATION: ICD-10-CM

## 2023-03-14 DIAGNOSIS — M25.512 CHRONIC LEFT SHOULDER PAIN: ICD-10-CM

## 2023-03-14 DIAGNOSIS — R74.01 TRANSAMINITIS: ICD-10-CM

## 2023-03-14 DIAGNOSIS — R35.1 NOCTURIA: ICD-10-CM

## 2023-03-14 DIAGNOSIS — N52.9 ERECTILE DYSFUNCTION, UNSPECIFIED ERECTILE DYSFUNCTION TYPE: ICD-10-CM

## 2023-03-14 DIAGNOSIS — R21 RASH: Primary | ICD-10-CM

## 2023-03-14 DIAGNOSIS — J30.89 ALLERGIC RHINITIS DUE TO OTHER ALLERGIC TRIGGER, UNSPECIFIED SEASONALITY: ICD-10-CM

## 2023-03-14 DIAGNOSIS — K21.9 GASTROESOPHAGEAL REFLUX DISEASE WITHOUT ESOPHAGITIS: ICD-10-CM

## 2023-03-14 DIAGNOSIS — G89.29 CHRONIC LEFT SHOULDER PAIN: ICD-10-CM

## 2023-03-14 DIAGNOSIS — Z12.11 SCREENING FOR COLON CANCER: ICD-10-CM

## 2023-03-14 DIAGNOSIS — R10.84 GENERALIZED ABDOMINAL PAIN: ICD-10-CM

## 2023-03-14 DIAGNOSIS — I10 ESSENTIAL HYPERTENSION: Chronic | ICD-10-CM

## 2023-03-14 DIAGNOSIS — G47.09 OTHER INSOMNIA: ICD-10-CM

## 2023-03-14 PROCEDURE — 3008F BODY MASS INDEX DOCD: CPT | Mod: CPTII,S$GLB,, | Performed by: INTERNAL MEDICINE

## 2023-03-14 PROCEDURE — 99999 PR PBB SHADOW E&M-EST. PATIENT-LVL V: CPT | Mod: PBBFAC,,, | Performed by: INTERNAL MEDICINE

## 2023-03-14 PROCEDURE — 1159F MED LIST DOCD IN RCRD: CPT | Mod: CPTII,S$GLB,, | Performed by: INTERNAL MEDICINE

## 2023-03-14 PROCEDURE — 99214 PR OFFICE/OUTPT VISIT, EST, LEVL IV, 30-39 MIN: ICD-10-PCS | Mod: S$GLB,,, | Performed by: INTERNAL MEDICINE

## 2023-03-14 PROCEDURE — 1160F PR REVIEW ALL MEDS BY PRESCRIBER/CLIN PHARMACIST DOCUMENTED: ICD-10-PCS | Mod: CPTII,S$GLB,, | Performed by: INTERNAL MEDICINE

## 2023-03-14 PROCEDURE — 1159F PR MEDICATION LIST DOCUMENTED IN MEDICAL RECORD: ICD-10-PCS | Mod: CPTII,S$GLB,, | Performed by: INTERNAL MEDICINE

## 2023-03-14 PROCEDURE — 3079F DIAST BP 80-89 MM HG: CPT | Mod: CPTII,S$GLB,, | Performed by: INTERNAL MEDICINE

## 2023-03-14 PROCEDURE — 3008F PR BODY MASS INDEX (BMI) DOCUMENTED: ICD-10-PCS | Mod: CPTII,S$GLB,, | Performed by: INTERNAL MEDICINE

## 2023-03-14 PROCEDURE — 1160F RVW MEDS BY RX/DR IN RCRD: CPT | Mod: CPTII,S$GLB,, | Performed by: INTERNAL MEDICINE

## 2023-03-14 PROCEDURE — 99999 PR PBB SHADOW E&M-EST. PATIENT-LVL V: ICD-10-PCS | Mod: PBBFAC,,, | Performed by: INTERNAL MEDICINE

## 2023-03-14 PROCEDURE — 99214 OFFICE O/P EST MOD 30 MIN: CPT | Mod: S$GLB,,, | Performed by: INTERNAL MEDICINE

## 2023-03-14 PROCEDURE — 3079F PR MOST RECENT DIASTOLIC BLOOD PRESSURE 80-89 MM HG: ICD-10-PCS | Mod: CPTII,S$GLB,, | Performed by: INTERNAL MEDICINE

## 2023-03-14 PROCEDURE — 3075F PR MOST RECENT SYSTOLIC BLOOD PRESS GE 130-139MM HG: ICD-10-PCS | Mod: CPTII,S$GLB,, | Performed by: INTERNAL MEDICINE

## 2023-03-14 PROCEDURE — 3075F SYST BP GE 130 - 139MM HG: CPT | Mod: CPTII,S$GLB,, | Performed by: INTERNAL MEDICINE

## 2023-03-14 RX ORDER — PSEUDOEPHEDRINE HCL 30 MG
30 TABLET ORAL DAILY PRN
Qty: 30 TABLET | Refills: 2 | Status: SHIPPED | OUTPATIENT
Start: 2023-03-14 | End: 2023-04-03 | Stop reason: SDUPTHER

## 2023-03-14 RX ORDER — TRIAMCINOLONE ACETONIDE 1 MG/G
CREAM TOPICAL 2 TIMES DAILY
Qty: 45 G | Refills: 0 | Status: SHIPPED | OUTPATIENT
Start: 2023-03-14 | End: 2023-03-28

## 2023-03-14 RX ORDER — AMITRIPTYLINE HYDROCHLORIDE 10 MG/1
10 TABLET, FILM COATED ORAL NIGHTLY PRN
Qty: 90 TABLET | Refills: 3 | Status: SHIPPED | OUTPATIENT
Start: 2023-03-14

## 2023-03-14 RX ORDER — PANTOPRAZOLE SODIUM 20 MG/1
20 TABLET, DELAYED RELEASE ORAL DAILY PRN
Qty: 90 TABLET | Refills: 3 | Status: SHIPPED | OUTPATIENT
Start: 2023-03-14 | End: 2024-03-13

## 2023-03-14 RX ORDER — SILDENAFIL 25 MG/1
25 TABLET, FILM COATED ORAL DAILY PRN
Qty: 15 TABLET | Refills: 2 | Status: SHIPPED | OUTPATIENT
Start: 2023-03-14 | End: 2023-06-21

## 2023-03-14 RX ORDER — MELOXICAM 15 MG/1
15 TABLET ORAL DAILY
Qty: 30 TABLET | Refills: 2 | Status: SHIPPED | OUTPATIENT
Start: 2023-03-14 | End: 2023-07-31

## 2023-03-14 RX ORDER — HYDROCHLOROTHIAZIDE 12.5 MG/1
12.5 TABLET ORAL DAILY
Qty: 90 TABLET | Refills: 3 | Status: SHIPPED | OUTPATIENT
Start: 2023-03-14 | End: 2024-03-13

## 2023-03-14 NOTE — PROGRESS NOTES
This note was created by combination of typed  and M-Modal dictation.  Transcription errors may be present.  If there are any questions, please contact me.    Assessment and Plan:   Rash  -unclear etiology, not consistent with shingles, could be residual from previous infection/cyst?  Looks like it is residual blemish/hyperpigmentation with scale.  Trial topical triamcinolone.  -     triamcinolone acetonide 0.1% (KENALOG) 0.1 % cream; Apply topically 2 (two) times daily. for 14 days  Dispense: 45 g; Refill: 0    Generalized abdominal pain  -seems to coincide ever since the death of his mother.  Could be some meditation.  Notes pain in the right upper quadrant but also in the lower quadrants.    Check CBC, CMP, lipase   Check right upper quadrant ultrasound   Does not feel like his previous episodes of GERD but I would try PPI and see if that helps.  History of right upper quadrant ultrasound 12/2021 with fatty liver.  Gallbladder showed crystals versus very tiny stones.  Would see if these have evolved into larger stones.  -     Lipase; Future; Expected date: 03/14/2023  -     US Abdomen Limited; Future; Expected date: 03/14/2023  -     Urinalysis, Reflex to Urine Culture Urine, Clean Catch; Future; Expected date: 03/14/2023    Essential hypertension  -blood pressure today is actually not too bad, off of medication for the past month or 2.  Refilled the HCTZ.  -     CBC Auto Differential; Future; Expected date: 03/15/2023  -     Comprehensive Metabolic Panel; Future; Expected date: 03/15/2023  -     Lipid Panel; Future; Expected date: 03/15/2023  -     hydroCHLOROthiazide (HYDRODIURIL) 12.5 MG Tab; Take 1 tablet (12.5 mg total) by mouth once daily.  Dispense: 90 tablet; Refill: 3    Other insomnia  -refilled amitriptyline  -     amitriptyline (ELAVIL) 10 MG tablet; Take 1 tablet (10 mg total) by mouth nightly as needed for Insomnia.  Dispense: 90 tablet; Refill: 3    Gastroesophageal reflux disease without  esophagitis  -refilled pantoprazole.  Takes p.r.n.  -     pantoprazole (PROTONIX) 20 MG tablet; Take 1 tablet (20 mg total) by mouth daily as needed (heartburn).  Dispense: 90 tablet; Refill: 3    Transaminitis  -check labs, ultrasound.  PCP felt likely fatty liver  -     CBC Auto Differential; Future; Expected date: 03/15/2023  -     Comprehensive Metabolic Panel; Future; Expected date: 03/15/2023  -     US Abdomen Limited; Future; Expected date: 03/14/2023    Nocturia  -check urinalysis look for infection.  Last PSA was normal.  -     Urinalysis, Reflex to Urine Culture Urine, Clean Catch; Future; Expected date: 03/14/2023    Retrograde ejaculation  Allergic rhinitis  -did not find Sudafed helpful for the retrograde ejaculation but he does find helpful for his sinus congestion with seasonal allergies.  Refilled Sudafed, recommended that he follow this up with Urology  -     pseudoephedrine (SUDAFED) 30 MG tablet; Take 1 tablet (30 mg total) by mouth daily as needed for Congestion.  Dispense: 30 tablet; Refill: 2    Erectile dysfunction, unspecified erectile dysfunction type  -refilled sildenafil  -     sildenafiL (VIAGRA) 25 MG tablet; Take 1 tablet (25 mg total) by mouth daily as needed for Erectile Dysfunction.  Dispense: 15 tablet; Refill: 2    Screening for colon cancer  -he prefers colonoscopy rather than fit kit  -     Ambulatory referral/consult to Endo Procedure ; Future; Expected date: 03/15/2023    Chronic left shoulder pain  -refilled meloxicam to take his left shoulder  -     meloxicam (MOBIC) 15 MG tablet; Take 1 tablet (15 mg total) by mouth once daily.  Dispense: 30 tablet; Refill: 2          Medications Discontinued During This Encounter   Medication Reason    sildenafiL (VIAGRA) 25 MG tablet Reorder    pantoprazole (PROTONIX) 20 MG tablet Reorder    hydroCHLOROthiazide (HYDRODIURIL) 12.5 MG Tab Reorder    meloxicam (MOBIC) 15 MG tablet Reorder    pseudoephedrine (SUDAFED) 30 MG tablet  Reorder    amitriptyline (ELAVIL) 10 MG tablet Reorder       meds sent this encounter:  Medications Ordered This Encounter   Medications    amitriptyline (ELAVIL) 10 MG tablet     Sig: Take 1 tablet (10 mg total) by mouth nightly as needed for Insomnia.     Dispense:  90 tablet     Refill:  3    hydroCHLOROthiazide (HYDRODIURIL) 12.5 MG Tab     Sig: Take 1 tablet (12.5 mg total) by mouth once daily.     Dispense:  90 tablet     Refill:  3     .    meloxicam (MOBIC) 15 MG tablet     Sig: Take 1 tablet (15 mg total) by mouth once daily.     Dispense:  30 tablet     Refill:  2    pantoprazole (PROTONIX) 20 MG tablet     Sig: Take 1 tablet (20 mg total) by mouth daily as needed (heartburn).     Dispense:  90 tablet     Refill:  3    pseudoephedrine (SUDAFED) 30 MG tablet     Sig: Take 1 tablet (30 mg total) by mouth daily as needed for Congestion.     Dispense:  30 tablet     Refill:  2    sildenafiL (VIAGRA) 25 MG tablet     Sig: Take 1 tablet (25 mg total) by mouth daily as needed for Erectile Dysfunction.     Dispense:  15 tablet     Refill:  2    triamcinolone acetonide 0.1% (KENALOG) 0.1 % cream     Sig: Apply topically 2 (two) times daily. for 14 days     Dispense:  45 g     Refill:  0       Follow Up: No follow-ups on file. PEx with PCP 1 year.     Subjective:     Chief Complaint   Patient presents with    Hypertension     Follow up        HPI  Brad is a 48 y.o. male.     Social History     Tobacco Use    Smoking status: Never    Smokeless tobacco: Never   Substance Use Topics    Alcohol use: Yes          Social History     Social History Narrative    Not on file       Last appointment with this clinic was Visit date not found. Last visit with me Visit date not found   To summarize last visit and events leading up to today:  Hypertension, HCTZ.  History of lisinopril HCTZ  Elevated LFTs, right upper quadrant ultrasound showing fatty liver.  Elevated alk-phos.  Transaminases trending down.  Viral hepatitis  "panel negative    Today's visit:  Please note: Chronic medical problems that are stable but are being addressed at this visit may not be listed/documented here, but may be addressed in more detail in the Assessment and Plan section.   Below are salient features of chronic medical conditions, or new/acute conditions and their details.    Feels like abd with "fever"   Coincident rash on the RUQ area  BMs not good.  Does not go every day. Used to be daily.  Hard stool.  Different colors but no blood.   No pain with eating.  Appetite low. Thinks due to mother passing.  X months.   History of right upper quadrant ultrasound showing fatty liver and questionable crystals verses early small gallstones.    Hypertension out of his medicines for months.  He was dealing with his mother being in other hospital and ultimately her death.  Takes HCTZ low-dose.  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  Blood pressure today is actually okay.    Out of PPI. Had a gas stuck sensation in the chest.  That is different than curernt symptoms.     Elavil for sleep.  Needs refill on that.    Sudafed for retrograde ejaculation.  Did not work.  But did help with sinuses.     Mobic for shoulder.  The left shoulder    Patient Care Team:  Mark Anthony Hogan MD as PCP - General (Internal Medicine)    Patient Active Problem List    Diagnosis Date Noted    Essential hypertension 12/09/2021    Acute pain of right shoulder 10/15/2020    Stiffness of right shoulder joint 10/15/2020    Weakness of shoulder 10/15/2020    Obesity, Class I, BMI 30-34.9 03/25/2015    Other insomnia 02/26/2015     Shift work         PAST MEDICAL PROBLEMS, PAST SURGICAL HISTORY: please see relevant portions of the electronic medical record    ALLERGIES AND MEDICATIONS: updated and reviewed.  Review of patient's allergies indicates:  No Known Allergies    Medication List with Changes/Refills   Current Medications    AMITRIPTYLINE (ELAVIL) 10 MG TABLET    TAKE 1 " "TABLET(10 MG) BY MOUTH EVERY NIGHT AS NEEDED FOR INSOMNIA    HYDROCHLOROTHIAZIDE (HYDRODIURIL) 12.5 MG TAB    Take 1 tablet (12.5 mg total) by mouth once daily.    MELOXICAM (MOBIC) 15 MG TABLET    Take 1 tablet (15 mg total) by mouth once daily.    PANTOPRAZOLE (PROTONIX) 20 MG TABLET    Take 1 tablet (20 mg total) by mouth once daily.    PSEUDOEPHEDRINE (SUDAFED) 30 MG TABLET    Take 1 tablet (30 mg total) by mouth every 4 (four) hours as needed for Congestion.    SILDENAFIL (VIAGRA) 25 MG TABLET    Take 1 tablet (25 mg total) by mouth daily as needed for Erectile Dysfunction.        Objective:   Physical Exam   Vitals:    03/14/23 1515   BP: 138/86   BP Location: Left arm   Patient Position: Sitting   BP Method: Large (Manual)   Pulse: 73   Temp: 99 °F (37.2 °C)   TempSrc: Oral   SpO2: 96%   Weight: 103.5 kg (228 lb 2.8 oz)   Height: 5' 10" (1.778 m)    Body mass index is 32.74 kg/m².  Weight: 103.5 kg (228 lb 2.8 oz)   Height: 5' 10" (177.8 cm)     Physical Exam  Constitutional:       Appearance: Normal appearance. He is well-developed.   HENT:      Right Ear: Tympanic membrane and external ear normal.      Left Ear: Tympanic membrane and external ear normal.      Nose: Nose normal.   Eyes:      General: No scleral icterus.     Conjunctiva/sclera: Conjunctivae normal.   Neck:      Thyroid: No thyroid mass or thyromegaly.      Trachea: Trachea normal.   Cardiovascular:      Rate and Rhythm: Normal rate and regular rhythm.      Heart sounds: Normal heart sounds, S1 normal and S2 normal. No murmur heard.  Pulmonary:      Effort: Pulmonary effort is normal.      Breath sounds: Normal breath sounds.   Abdominal:      General: There is no distension.      Palpations: Abdomen is soft. There is no hepatomegaly, splenomegaly or mass.      Tenderness: There is no abdominal tenderness.   Musculoskeletal:         General: No deformity.      Right lower leg: No edema.      Left lower leg: No edema.   Lymphadenopathy:      " Cervical: No cervical adenopathy.   Skin:     General: Skin is warm and dry.      Findings: Rash (on exposed skin) present.      Comments: On the right upper quadrant area around the mid axillary line is a scaly papule with some residual erythema it is about to cm in diameter, without induration, but scaly hyperkeratotic.  Well demarcated   Neurological:      Mental Status: He is alert and oriented to person, place, and time.      Cranial Nerves: No cranial nerve deficit.      Sensory: No sensory deficit.      Deep Tendon Reflexes: Reflexes are normal and symmetric.   Psychiatric:         Speech: Speech normal.         Behavior: Behavior normal.         Thought Content: Thought content normal.         Judgment: Judgment normal.

## 2023-03-18 ENCOUNTER — LAB VISIT (OUTPATIENT)
Dept: LAB | Facility: HOSPITAL | Age: 48
End: 2023-03-18
Attending: INTERNAL MEDICINE
Payer: COMMERCIAL

## 2023-03-18 DIAGNOSIS — R10.84 GENERALIZED ABDOMINAL PAIN: ICD-10-CM

## 2023-03-18 DIAGNOSIS — R74.01 TRANSAMINITIS: ICD-10-CM

## 2023-03-18 DIAGNOSIS — I10 ESSENTIAL HYPERTENSION: Chronic | ICD-10-CM

## 2023-03-18 LAB
ALBUMIN SERPL BCP-MCNC: 4 G/DL (ref 3.5–5.2)
ALP SERPL-CCNC: 100 U/L (ref 55–135)
ALT SERPL W/O P-5'-P-CCNC: 32 U/L (ref 10–44)
ANION GAP SERPL CALC-SCNC: 8 MMOL/L (ref 8–16)
AST SERPL-CCNC: 23 U/L (ref 10–40)
BASOPHILS # BLD AUTO: 0.02 K/UL (ref 0–0.2)
BASOPHILS NFR BLD: 0.5 % (ref 0–1.9)
BILIRUB SERPL-MCNC: 0.6 MG/DL (ref 0.1–1)
BUN SERPL-MCNC: 15 MG/DL (ref 6–20)
CALCIUM SERPL-MCNC: 9.6 MG/DL (ref 8.7–10.5)
CHLORIDE SERPL-SCNC: 104 MMOL/L (ref 95–110)
CHOLEST SERPL-MCNC: 192 MG/DL (ref 120–199)
CHOLEST/HDLC SERPL: 3.6 {RATIO} (ref 2–5)
CO2 SERPL-SCNC: 30 MMOL/L (ref 23–29)
CREAT SERPL-MCNC: 1.3 MG/DL (ref 0.5–1.4)
DIFFERENTIAL METHOD: NORMAL
EOSINOPHIL # BLD AUTO: 0.1 K/UL (ref 0–0.5)
EOSINOPHIL NFR BLD: 3.5 % (ref 0–8)
ERYTHROCYTE [DISTWIDTH] IN BLOOD BY AUTOMATED COUNT: 12.6 % (ref 11.5–14.5)
EST. GFR  (NO RACE VARIABLE): >60 ML/MIN/1.73 M^2
GLUCOSE SERPL-MCNC: 92 MG/DL (ref 70–110)
HCT VFR BLD AUTO: 47.5 % (ref 40–54)
HDLC SERPL-MCNC: 53 MG/DL (ref 40–75)
HDLC SERPL: 27.6 % (ref 20–50)
HGB BLD-MCNC: 15.5 G/DL (ref 14–18)
IMM GRANULOCYTES # BLD AUTO: 0.01 K/UL (ref 0–0.04)
IMM GRANULOCYTES NFR BLD AUTO: 0.2 % (ref 0–0.5)
LDLC SERPL CALC-MCNC: 128.2 MG/DL (ref 63–159)
LIPASE SERPL-CCNC: 21 U/L (ref 4–60)
LYMPHOCYTES # BLD AUTO: 1.4 K/UL (ref 1–4.8)
LYMPHOCYTES NFR BLD: 34.4 % (ref 18–48)
MCH RBC QN AUTO: 29.3 PG (ref 27–31)
MCHC RBC AUTO-ENTMCNC: 32.6 G/DL (ref 32–36)
MCV RBC AUTO: 90 FL (ref 82–98)
MONOCYTES # BLD AUTO: 0.5 K/UL (ref 0.3–1)
MONOCYTES NFR BLD: 12.6 % (ref 4–15)
NEUTROPHILS # BLD AUTO: 2 K/UL (ref 1.8–7.7)
NEUTROPHILS NFR BLD: 48.8 % (ref 38–73)
NONHDLC SERPL-MCNC: 139 MG/DL
NRBC BLD-RTO: 0 /100 WBC
PLATELET # BLD AUTO: 221 K/UL (ref 150–450)
PMV BLD AUTO: 10.6 FL (ref 9.2–12.9)
POTASSIUM SERPL-SCNC: 4.5 MMOL/L (ref 3.5–5.1)
PROT SERPL-MCNC: 7.6 G/DL (ref 6–8.4)
RBC # BLD AUTO: 5.29 M/UL (ref 4.6–6.2)
SODIUM SERPL-SCNC: 142 MMOL/L (ref 136–145)
TRIGL SERPL-MCNC: 54 MG/DL (ref 30–150)
WBC # BLD AUTO: 4.04 K/UL (ref 3.9–12.7)

## 2023-03-18 PROCEDURE — 36415 COLL VENOUS BLD VENIPUNCTURE: CPT | Mod: PO | Performed by: INTERNAL MEDICINE

## 2023-03-18 PROCEDURE — 83690 ASSAY OF LIPASE: CPT | Performed by: INTERNAL MEDICINE

## 2023-03-18 PROCEDURE — 80061 LIPID PANEL: CPT | Performed by: INTERNAL MEDICINE

## 2023-03-18 PROCEDURE — 80053 COMPREHEN METABOLIC PANEL: CPT | Performed by: INTERNAL MEDICINE

## 2023-03-18 PROCEDURE — 85025 COMPLETE CBC W/AUTO DIFF WBC: CPT | Performed by: INTERNAL MEDICINE

## 2023-03-19 NOTE — PROGRESS NOTES
CBC WNL  CMP WNL; hx of mild elevated LFTs; normal this time  Lipase WNL  UA normal  Results to pt. Done for c/o abd pain, coincident with mother's passing  Abd US was ordered

## 2023-04-03 ENCOUNTER — OFFICE VISIT (OUTPATIENT)
Dept: UROLOGY | Facility: CLINIC | Age: 48
End: 2023-04-03
Payer: COMMERCIAL

## 2023-04-03 VITALS — WEIGHT: 224.56 LBS | BODY MASS INDEX: 32.22 KG/M2

## 2023-04-03 DIAGNOSIS — N53.14 RETROGRADE EJACULATION: Primary | ICD-10-CM

## 2023-04-03 DIAGNOSIS — J30.89 ALLERGIC RHINITIS DUE TO OTHER ALLERGIC TRIGGER, UNSPECIFIED SEASONALITY: ICD-10-CM

## 2023-04-03 DIAGNOSIS — R35.1 NOCTURIA: ICD-10-CM

## 2023-04-03 DIAGNOSIS — N40.0 BPH WITHOUT URINARY OBSTRUCTION: ICD-10-CM

## 2023-04-03 PROCEDURE — 99999 PR PBB SHADOW E&M-EST. PATIENT-LVL III: CPT | Mod: PBBFAC,,, | Performed by: UROLOGY

## 2023-04-03 PROCEDURE — 99214 PR OFFICE/OUTPT VISIT, EST, LEVL IV, 30-39 MIN: ICD-10-PCS | Mod: S$GLB,,, | Performed by: UROLOGY

## 2023-04-03 PROCEDURE — 1160F PR REVIEW ALL MEDS BY PRESCRIBER/CLIN PHARMACIST DOCUMENTED: ICD-10-PCS | Mod: CPTII,S$GLB,, | Performed by: UROLOGY

## 2023-04-03 PROCEDURE — 1159F PR MEDICATION LIST DOCUMENTED IN MEDICAL RECORD: ICD-10-PCS | Mod: CPTII,S$GLB,, | Performed by: UROLOGY

## 2023-04-03 PROCEDURE — 3008F PR BODY MASS INDEX (BMI) DOCUMENTED: ICD-10-PCS | Mod: CPTII,S$GLB,, | Performed by: UROLOGY

## 2023-04-03 PROCEDURE — 1159F MED LIST DOCD IN RCRD: CPT | Mod: CPTII,S$GLB,, | Performed by: UROLOGY

## 2023-04-03 PROCEDURE — 99999 PR PBB SHADOW E&M-EST. PATIENT-LVL III: ICD-10-PCS | Mod: PBBFAC,,, | Performed by: UROLOGY

## 2023-04-03 PROCEDURE — 99214 OFFICE O/P EST MOD 30 MIN: CPT | Mod: S$GLB,,, | Performed by: UROLOGY

## 2023-04-03 PROCEDURE — 3008F BODY MASS INDEX DOCD: CPT | Mod: CPTII,S$GLB,, | Performed by: UROLOGY

## 2023-04-03 PROCEDURE — 1160F RVW MEDS BY RX/DR IN RCRD: CPT | Mod: CPTII,S$GLB,, | Performed by: UROLOGY

## 2023-04-03 RX ORDER — ALFUZOSIN HYDROCHLORIDE 10 MG/1
10 TABLET, EXTENDED RELEASE ORAL DAILY
Qty: 30 TABLET | Refills: 11 | Status: SHIPPED | OUTPATIENT
Start: 2023-04-03 | End: 2023-05-09 | Stop reason: ALTCHOICE

## 2023-04-03 RX ORDER — PSEUDOEPHEDRINE HCL 30 MG
30 TABLET ORAL DAILY PRN
Qty: 30 TABLET | Refills: 2 | Status: SHIPPED | OUTPATIENT
Start: 2023-04-03 | End: 2023-06-21 | Stop reason: SDUPTHER

## 2023-04-03 NOTE — PROGRESS NOTES
Subjective:       Patient ID: Brad Perera is a 48 y.o. male The patient's last visit with me was on 1/12/2022.     Chief Complaint:   Chief Complaint   Patient presents with    Erectile Dysfunction     Difficulty Ejaculating  He has had issues for the past 1.5 years with no semen coming out during orgasm.  He denies any trauma or STD.  He does not take any alpha blockers.  He denies issues with orgasm but no semen.  He denies erection issues.    04/03/2023   He took Sudafed but he took it in the morning.  He did not feel that it helps.    Benign Prostatic Hypertrophy  Patient complains of lower urinary tract symptoms. He reports frequency, nocturia four times a night and urgency. He denies weak stream. Patient states symptoms are of moderate severity. Onset of symptoms was several years ago and was gradual in onset. His AUA Symptom Score is, 10/3 manifested as irritative symptoms including frequency, nocturia. He has no personal history and no family history of prostate cancer. He reports a history of no complicating symptoms. He denies flank pain, gross hematuria, kidney stones and recurrent UTI.     04/03/2023  He would like to try a medication to help with nocturia.      Question 3/31/2023  1:48 PM CDT - Filed by Patient   Over the past months, have you had:     Incomplete emptying: How often have you had the sensation of not emptying your bladder completely after you finished urinating?  3-Three Times   Frquency: How often have you had to urinate again less than two hours after you finished urinating? 2-Less than half the time   Intermittency: How often have you found you stopped and started again several times when you urinated? 5-Almost always   Urgency: How often do you find it difficult to postpone urination? 0-Not at all   Weak stream: How often have you had a weak urinary stream? 5-Almost always   Straining: How often have you had to push or strain to begin urination? 0-Not at all   Sleeping: How  many times did you most typically get up to urinate from the time you went to bed at night until the time you got up in the morning?  5 - Five times   Quality of life due to urinary symptoms: If you were to spend the rest of your life with your urinary condition the way it is now, how would you feel about that?  3-Mixed: equally satisfied/ dissatisfied   IPSS Score  (range: 0 - 35) 20 (Severe symtoms )           ACTIVE MEDICAL ISSUES:  Patient Active Problem List   Diagnosis    Other insomnia    Obesity, Class I, BMI 30-34.9    Acute pain of right shoulder    Stiffness of right shoulder joint    Weakness of shoulder    Essential hypertension       PAST MEDICAL HISTORY  Past Medical History:   Diagnosis Date    Hypertension 2/26/2015    Insomnia     Kidney stones     h/o kidney stones       PAST SURGICAL HISTORY:  History reviewed. No pertinent surgical history.    SOCIAL HISTORY:  Social History     Tobacco Use    Smoking status: Never    Smokeless tobacco: Never   Substance Use Topics    Alcohol use: Yes    Drug use: No       FAMILY HISTORY:  Family History   Problem Relation Age of Onset    Hypertension Mother        ALLERGIES AND MEDICATIONS: updated and reviewed.  Review of patient's allergies indicates:  No Known Allergies  Current Outpatient Medications   Medication Sig    amitriptyline (ELAVIL) 10 MG tablet Take 1 tablet (10 mg total) by mouth nightly as needed for Insomnia.    hydroCHLOROthiazide (HYDRODIURIL) 12.5 MG Tab Take 1 tablet (12.5 mg total) by mouth once daily.    meloxicam (MOBIC) 15 MG tablet Take 1 tablet (15 mg total) by mouth once daily.    pantoprazole (PROTONIX) 20 MG tablet Take 1 tablet (20 mg total) by mouth daily as needed (heartburn).    sildenafiL (VIAGRA) 25 MG tablet Take 1 tablet (25 mg total) by mouth daily as needed for Erectile Dysfunction.    alfuzosin (UROXATRAL) 10 mg Tb24 Take 1 tablet (10 mg total) by mouth once daily.    pseudoephedrine (SUDAFED) 30 MG tablet Take 1 tablet  (30 mg total) by mouth daily as needed for Congestion.    triamcinolone acetonide 0.1% (KENALOG) 0.1 % cream Apply topically 2 (two) times daily. for 14 days     No current facility-administered medications for this visit.       Review of Systems   Constitutional:  Negative for activity change, fatigue, fever and unexpected weight change.   HENT:  Negative for congestion.    Eyes:  Negative for redness.   Respiratory:  Negative for chest tightness and shortness of breath.    Cardiovascular:  Negative for chest pain and leg swelling.   Gastrointestinal:  Negative for abdominal pain, constipation, diarrhea, nausea and vomiting.   Genitourinary:  Negative for dysuria, flank pain, frequency, hematuria, penile pain, penile swelling, scrotal swelling, testicular pain and urgency.   Musculoskeletal:  Negative for arthralgias and back pain.   Neurological:  Negative for dizziness and light-headedness.   Psychiatric/Behavioral:  Negative for behavioral problems and confusion. The patient is not nervous/anxious.    All other systems reviewed and are negative.    Objective:      Vitals:    04/03/23 1350   Weight: 101.9 kg (224 lb 8.6 oz)       Physical Exam  Vitals and nursing note reviewed.   Constitutional:       Appearance: He is well-developed.   HENT:      Head: Normocephalic.   Eyes:      Conjunctiva/sclera: Conjunctivae normal.   Neck:      Thyroid: No thyromegaly.      Trachea: No tracheal deviation.   Cardiovascular:      Rate and Rhythm: Normal rate.      Heart sounds: Normal heart sounds.   Pulmonary:      Effort: Pulmonary effort is normal. No respiratory distress.      Breath sounds: Normal breath sounds. No wheezing.   Abdominal:      General: Bowel sounds are normal.      Palpations: Abdomen is soft.      Tenderness: There is no abdominal tenderness. There is no rebound.      Hernia: No hernia is present.   Musculoskeletal:         General: No tenderness. Normal range of motion.      Cervical back: Normal range  of motion and neck supple.   Lymphadenopathy:      Cervical: No cervical adenopathy.   Skin:     General: Skin is warm and dry.      Findings: No erythema or rash.   Neurological:      Mental Status: He is alert and oriented to person, place, and time.   Psychiatric:         Behavior: Behavior normal.         Thought Content: Thought content normal.         Judgment: Judgment normal.       Urine dipstick shows not done.  Micro exam: not done.         Assessment:       1. Retrograde ejaculation    2. Allergic rhinitis due to other allergic trigger, unspecified seasonality    3. Nocturia    4. BPH without urinary obstruction            Plan:       1. Retrograde ejaculation  Take Sudafed about an hour before sex  - pseudoephedrine (SUDAFED) 30 MG tablet; Take 1 tablet (30 mg total) by mouth daily as needed for Congestion.  Dispense: 30 tablet; Refill: 2    2. Allergic rhinitis due to other allergic trigger, unspecified seasonality    - pseudoephedrine (SUDAFED) 30 MG tablet; Take 1 tablet (30 mg total) by mouth daily as needed for Congestion.  Dispense: 30 tablet; Refill: 2    3. Nocturia  We discussed alpha blockers can cause retrograde ejaculation.  He wants to try.    - alfuzosin (UROXATRAL) 10 mg Tb24; Take 1 tablet (10 mg total) by mouth once daily.  Dispense: 30 tablet; Refill: 11    4. BPH without urinary obstruction    - Prostate Specific Antigen, Diagnostic; Future           Follow up in about 3 months (around 7/3/2023) for Follow up Established, Review PSA.

## 2023-05-09 ENCOUNTER — OFFICE VISIT (OUTPATIENT)
Dept: UROLOGY | Facility: CLINIC | Age: 48
End: 2023-05-09
Payer: COMMERCIAL

## 2023-05-09 VITALS — WEIGHT: 228.5 LBS | BODY MASS INDEX: 32.79 KG/M2

## 2023-05-09 DIAGNOSIS — R35.1 NOCTURIA: Primary | ICD-10-CM

## 2023-05-09 DIAGNOSIS — N40.0 BPH WITHOUT URINARY OBSTRUCTION: ICD-10-CM

## 2023-05-09 DIAGNOSIS — N53.14 RETROGRADE EJACULATION: ICD-10-CM

## 2023-05-09 PROCEDURE — 99999 PR PBB SHADOW E&M-EST. PATIENT-LVL III: CPT | Mod: PBBFAC,,, | Performed by: UROLOGY

## 2023-05-09 PROCEDURE — 99999 PR PBB SHADOW E&M-EST. PATIENT-LVL III: ICD-10-PCS | Mod: PBBFAC,,, | Performed by: UROLOGY

## 2023-05-09 PROCEDURE — 99214 PR OFFICE/OUTPT VISIT, EST, LEVL IV, 30-39 MIN: ICD-10-PCS | Mod: S$GLB,,, | Performed by: UROLOGY

## 2023-05-09 PROCEDURE — 1159F PR MEDICATION LIST DOCUMENTED IN MEDICAL RECORD: ICD-10-PCS | Mod: CPTII,S$GLB,, | Performed by: UROLOGY

## 2023-05-09 PROCEDURE — 3008F BODY MASS INDEX DOCD: CPT | Mod: CPTII,S$GLB,, | Performed by: UROLOGY

## 2023-05-09 PROCEDURE — 1160F PR REVIEW ALL MEDS BY PRESCRIBER/CLIN PHARMACIST DOCUMENTED: ICD-10-PCS | Mod: CPTII,S$GLB,, | Performed by: UROLOGY

## 2023-05-09 PROCEDURE — 1159F MED LIST DOCD IN RCRD: CPT | Mod: CPTII,S$GLB,, | Performed by: UROLOGY

## 2023-05-09 PROCEDURE — 3008F PR BODY MASS INDEX (BMI) DOCUMENTED: ICD-10-PCS | Mod: CPTII,S$GLB,, | Performed by: UROLOGY

## 2023-05-09 PROCEDURE — 99214 OFFICE O/P EST MOD 30 MIN: CPT | Mod: S$GLB,,, | Performed by: UROLOGY

## 2023-05-09 PROCEDURE — 1160F RVW MEDS BY RX/DR IN RCRD: CPT | Mod: CPTII,S$GLB,, | Performed by: UROLOGY

## 2023-05-09 RX ORDER — OXYBUTYNIN CHLORIDE 5 MG/1
5 TABLET, EXTENDED RELEASE ORAL DAILY
Qty: 30 TABLET | Refills: 11 | Status: SHIPPED | OUTPATIENT
Start: 2023-05-09 | End: 2023-06-21 | Stop reason: SDUPTHER

## 2023-05-09 NOTE — PROGRESS NOTES
Subjective:       Patient ID: Brad Perera is a 48 y.o. male The patient's last visit with me was on 4/3/2023.     Chief Complaint:   Chief Complaint   Patient presents with    Follow-up     Difficulty Ejaculating  He has had issues for the past 1.5 years with no semen coming out during orgasm.  He denies any trauma or STD.  He does not take any alpha blockers.  He denies issues with orgasm but no semen.  He denies erection issues.    4/3/2023  He took Sudafed but he took it in the morning.  He did not feel that it helps.    05/09/2023  He is still having this issue.    Benign Prostatic Hypertrophy  Patient complains of lower urinary tract symptoms. He reports frequency, nocturia four times a night and urgency. He denies weak stream. Patient states symptoms are of moderate severity. Onset of symptoms was several years ago and was gradual in onset. His AUA Symptom Score is, 10/3 manifested as irritative symptoms including frequency, nocturia. He has no personal history and no family history of prostate cancer. He reports a history of no complicating symptoms. He denies flank pain, gross hematuria, kidney stones and recurrent UTI.     4/3/2023  He would like to try a medication to help with nocturia.  IPSS 20/3    05/09/2023  He did not feel Uroxatral help.  He has nocturia x 5.          ACTIVE MEDICAL ISSUES:  Patient Active Problem List   Diagnosis    Other insomnia    Obesity, Class I, BMI 30-34.9    Acute pain of right shoulder    Stiffness of right shoulder joint    Weakness of shoulder    Essential hypertension       PAST MEDICAL HISTORY  Past Medical History:   Diagnosis Date    Hypertension 2/26/2015    Insomnia     Kidney stones     h/o kidney stones       PAST SURGICAL HISTORY:  History reviewed. No pertinent surgical history.    SOCIAL HISTORY:  Social History     Tobacco Use    Smoking status: Never    Smokeless tobacco: Never   Substance Use Topics    Alcohol use: Yes    Drug use: No       FAMILY  HISTORY:  Family History   Problem Relation Age of Onset    Hypertension Mother        ALLERGIES AND MEDICATIONS: updated and reviewed.  Review of patient's allergies indicates:  No Known Allergies  Current Outpatient Medications   Medication Sig    amitriptyline (ELAVIL) 10 MG tablet Take 1 tablet (10 mg total) by mouth nightly as needed for Insomnia.    hydroCHLOROthiazide (HYDRODIURIL) 12.5 MG Tab Take 1 tablet (12.5 mg total) by mouth once daily.    meloxicam (MOBIC) 15 MG tablet Take 1 tablet (15 mg total) by mouth once daily.    pantoprazole (PROTONIX) 20 MG tablet Take 1 tablet (20 mg total) by mouth daily as needed (heartburn).    pseudoephedrine (SUDAFED) 30 MG tablet Take 1 tablet (30 mg total) by mouth daily as needed for Congestion.    sildenafiL (VIAGRA) 25 MG tablet Take 1 tablet (25 mg total) by mouth daily as needed for Erectile Dysfunction.    oxybutynin (DITROPAN-XL) 5 MG TR24 Take 1 tablet (5 mg total) by mouth once daily.    triamcinolone acetonide 0.1% (KENALOG) 0.1 % cream Apply topically 2 (two) times daily. for 14 days     No current facility-administered medications for this visit.       Review of Systems   Constitutional:  Negative for activity change, fatigue, fever and unexpected weight change.   HENT:  Negative for congestion.    Eyes:  Negative for redness.   Respiratory:  Negative for chest tightness and shortness of breath.    Cardiovascular:  Negative for chest pain and leg swelling.   Gastrointestinal:  Negative for abdominal pain, constipation, diarrhea, nausea and vomiting.   Genitourinary:  Negative for dysuria, flank pain, frequency, hematuria, penile pain, penile swelling, scrotal swelling, testicular pain and urgency.   Musculoskeletal:  Negative for arthralgias and back pain.   Neurological:  Negative for dizziness and light-headedness.   Psychiatric/Behavioral:  Negative for behavioral problems and confusion. The patient is not nervous/anxious.    All other systems reviewed  and are negative.    Objective:      Vitals:    05/09/23 1616   Weight: 103.7 kg (228 lb 8.1 oz)         Physical Exam  Vitals and nursing note reviewed.   Constitutional:       Appearance: He is well-developed.   HENT:      Head: Normocephalic.   Eyes:      Conjunctiva/sclera: Conjunctivae normal.   Neck:      Thyroid: No thyromegaly.      Trachea: No tracheal deviation.   Cardiovascular:      Rate and Rhythm: Normal rate.      Heart sounds: Normal heart sounds.   Pulmonary:      Effort: Pulmonary effort is normal. No respiratory distress.      Breath sounds: Normal breath sounds. No wheezing.   Abdominal:      General: Bowel sounds are normal.      Palpations: Abdomen is soft.      Tenderness: There is no abdominal tenderness. There is no rebound.      Hernia: No hernia is present.   Musculoskeletal:         General: No tenderness. Normal range of motion.      Cervical back: Normal range of motion and neck supple.   Lymphadenopathy:      Cervical: No cervical adenopathy.   Skin:     General: Skin is warm and dry.      Findings: No erythema or rash.   Neurological:      Mental Status: He is alert and oriented to person, place, and time.   Psychiatric:         Behavior: Behavior normal.         Thought Content: Thought content normal.         Judgment: Judgment normal.       Urine dipstick shows not done.  Micro exam: not done.         Assessment:       1. Nocturia    2. BPH without urinary obstruction    3. Retrograde ejaculation              Plan:       1. Nocturia  Switch to Ditropan  - oxybutynin (DITROPAN-XL) 5 MG TR24; Take 1 tablet (5 mg total) by mouth once daily.  Dispense: 30 tablet; Refill: 11    2. BPH without urinary obstruction  stable    3. Retrograde ejaculation  Try taking sudafed 4 hours before  Coming off of uroxatral may help           Follow up in about 6 weeks (around 6/20/2023) for Follow up Established.

## 2023-05-29 ENCOUNTER — TELEPHONE (OUTPATIENT)
Dept: ENDOSCOPY | Facility: HOSPITAL | Age: 48
End: 2023-05-29
Payer: COMMERCIAL

## 2023-06-14 ENCOUNTER — LAB VISIT (OUTPATIENT)
Dept: LAB | Facility: HOSPITAL | Age: 48
End: 2023-06-14
Attending: UROLOGY
Payer: COMMERCIAL

## 2023-06-14 DIAGNOSIS — N40.0 BPH WITHOUT URINARY OBSTRUCTION: ICD-10-CM

## 2023-06-14 LAB — COMPLEXED PSA SERPL-MCNC: 0.4 NG/ML (ref 0–4)

## 2023-06-14 PROCEDURE — 36415 COLL VENOUS BLD VENIPUNCTURE: CPT | Mod: PO | Performed by: UROLOGY

## 2023-06-14 PROCEDURE — 84153 ASSAY OF PSA TOTAL: CPT | Performed by: UROLOGY

## 2023-06-21 ENCOUNTER — OFFICE VISIT (OUTPATIENT)
Dept: UROLOGY | Facility: CLINIC | Age: 48
End: 2023-06-21
Payer: COMMERCIAL

## 2023-06-21 VITALS — WEIGHT: 227.06 LBS | BODY MASS INDEX: 32.58 KG/M2

## 2023-06-21 DIAGNOSIS — N52.9 ERECTILE DYSFUNCTION, UNSPECIFIED ERECTILE DYSFUNCTION TYPE: ICD-10-CM

## 2023-06-21 DIAGNOSIS — R35.1 NOCTURIA: ICD-10-CM

## 2023-06-21 DIAGNOSIS — N53.14 RETROGRADE EJACULATION: ICD-10-CM

## 2023-06-21 DIAGNOSIS — J30.89 ALLERGIC RHINITIS DUE TO OTHER ALLERGIC TRIGGER, UNSPECIFIED SEASONALITY: ICD-10-CM

## 2023-06-21 PROCEDURE — 99999 PR PBB SHADOW E&M-EST. PATIENT-LVL III: ICD-10-PCS | Mod: PBBFAC,,, | Performed by: UROLOGY

## 2023-06-21 PROCEDURE — 99214 PR OFFICE/OUTPT VISIT, EST, LEVL IV, 30-39 MIN: ICD-10-PCS | Mod: S$GLB,,, | Performed by: UROLOGY

## 2023-06-21 PROCEDURE — 3008F PR BODY MASS INDEX (BMI) DOCUMENTED: ICD-10-PCS | Mod: CPTII,S$GLB,, | Performed by: UROLOGY

## 2023-06-21 PROCEDURE — 99214 OFFICE O/P EST MOD 30 MIN: CPT | Mod: S$GLB,,, | Performed by: UROLOGY

## 2023-06-21 PROCEDURE — 1160F RVW MEDS BY RX/DR IN RCRD: CPT | Mod: CPTII,S$GLB,, | Performed by: UROLOGY

## 2023-06-21 PROCEDURE — 99999 PR PBB SHADOW E&M-EST. PATIENT-LVL III: CPT | Mod: PBBFAC,,, | Performed by: UROLOGY

## 2023-06-21 PROCEDURE — 1160F PR REVIEW ALL MEDS BY PRESCRIBER/CLIN PHARMACIST DOCUMENTED: ICD-10-PCS | Mod: CPTII,S$GLB,, | Performed by: UROLOGY

## 2023-06-21 PROCEDURE — 1159F MED LIST DOCD IN RCRD: CPT | Mod: CPTII,S$GLB,, | Performed by: UROLOGY

## 2023-06-21 PROCEDURE — 1159F PR MEDICATION LIST DOCUMENTED IN MEDICAL RECORD: ICD-10-PCS | Mod: CPTII,S$GLB,, | Performed by: UROLOGY

## 2023-06-21 PROCEDURE — 3008F BODY MASS INDEX DOCD: CPT | Mod: CPTII,S$GLB,, | Performed by: UROLOGY

## 2023-06-21 RX ORDER — ALFUZOSIN HYDROCHLORIDE 10 MG/1
TABLET, EXTENDED RELEASE ORAL
COMMUNITY
Start: 2023-06-04 | End: 2023-06-21 | Stop reason: ALTCHOICE

## 2023-06-21 RX ORDER — PSEUDOEPHEDRINE HCL 30 MG
30 TABLET ORAL DAILY PRN
Qty: 30 TABLET | Refills: 5 | Status: SHIPPED | OUTPATIENT
Start: 2023-06-21 | End: 2023-12-19 | Stop reason: SDUPTHER

## 2023-06-21 RX ORDER — SILDENAFIL 100 MG/1
100 TABLET, FILM COATED ORAL DAILY PRN
Qty: 15 TABLET | Refills: 11 | Status: SHIPPED | OUTPATIENT
Start: 2023-06-21 | End: 2023-11-20 | Stop reason: SDUPTHER

## 2023-06-21 RX ORDER — OXYBUTYNIN CHLORIDE 5 MG/1
5 TABLET, EXTENDED RELEASE ORAL DAILY
Qty: 30 TABLET | Refills: 11 | Status: SHIPPED | OUTPATIENT
Start: 2023-06-21 | End: 2024-03-19 | Stop reason: SDUPTHER

## 2023-06-21 NOTE — PROGRESS NOTES
Subjective:       Patient ID: Brad Perera is a 48 y.o. male The patient's last visit with me was on 5/9/2023.     Chief Complaint:   Chief Complaint   Patient presents with    Follow-up     Difficulty Ejaculating  He has had issues for the past 1.5 years with no semen coming out during orgasm.  He denies any trauma or STD.  He does not take any alpha blockers.  He denies issues with orgasm but no semen.  He denies erection issues.    4/3/2023  He took Sudafed but he took it in the morning.  He did not feel that it helps.    5/9/2023  He is still having this issue.    06/21/2023  He still has issues.  He feels the Sudafed helps minimally.  He still voids out his semen afterwards.    Benign Prostatic Hypertrophy  Patient complains of lower urinary tract symptoms. He reports frequency, nocturia four times a night and urgency. He denies weak stream. Patient states symptoms are of moderate severity. Onset of symptoms was several years ago and was gradual in onset. His AUA Symptom Score is, 10/3 manifested as irritative symptoms including frequency, nocturia. He has no personal history and no family history of prostate cancer. He reports a history of no complicating symptoms. He denies flank pain, gross hematuria, kidney stones and recurrent UTI.     4/3/2023  He would like to try a medication to help with nocturia.  IPSS 20/3    5/9/2023  He did not feel Uroxatral help.  He has nocturia x 5.      06/21/2023  He was changed to Oxybutynin last visit.  His nocturia is improved to 2.        ACTIVE MEDICAL ISSUES:  Patient Active Problem List   Diagnosis    Other insomnia    Obesity, Class I, BMI 30-34.9    Acute pain of right shoulder    Stiffness of right shoulder joint    Weakness of shoulder    Essential hypertension       PAST MEDICAL HISTORY  Past Medical History:   Diagnosis Date    Hypertension 2/26/2015    Insomnia     Kidney stones     h/o kidney stones       PAST SURGICAL HISTORY:  History reviewed. No  pertinent surgical history.    SOCIAL HISTORY:  Social History     Tobacco Use    Smoking status: Never    Smokeless tobacco: Never   Substance Use Topics    Alcohol use: Yes    Drug use: No       FAMILY HISTORY:  Family History   Problem Relation Age of Onset    Hypertension Mother        ALLERGIES AND MEDICATIONS: updated and reviewed.  Review of patient's allergies indicates:  No Known Allergies  Current Outpatient Medications   Medication Sig    hydroCHLOROthiazide (HYDRODIURIL) 12.5 MG Tab Take 1 tablet (12.5 mg total) by mouth once daily.    meloxicam (MOBIC) 15 MG tablet Take 1 tablet (15 mg total) by mouth once daily.    pantoprazole (PROTONIX) 20 MG tablet Take 1 tablet (20 mg total) by mouth daily as needed (heartburn).    amitriptyline (ELAVIL) 10 MG tablet Take 1 tablet (10 mg total) by mouth nightly as needed for Insomnia. (Patient not taking: Reported on 6/21/2023)    oxybutynin (DITROPAN-XL) 5 MG TR24 Take 1 tablet (5 mg total) by mouth once daily.    pseudoephedrine (SUDAFED) 30 MG tablet Take 1 tablet (30 mg total) by mouth daily as needed for Congestion.    sildenafiL (VIAGRA) 100 MG tablet Take 1 tablet (100 mg total) by mouth daily as needed for Erectile Dysfunction.    triamcinolone acetonide 0.1% (KENALOG) 0.1 % cream Apply topically 2 (two) times daily. for 14 days     No current facility-administered medications for this visit.       Review of Systems   Constitutional:  Negative for activity change, fatigue, fever and unexpected weight change.   HENT:  Negative for congestion.    Eyes:  Negative for redness.   Respiratory:  Negative for chest tightness and shortness of breath.    Cardiovascular:  Negative for chest pain and leg swelling.   Gastrointestinal:  Negative for abdominal pain, constipation, diarrhea, nausea and vomiting.   Genitourinary:  Negative for dysuria, flank pain, frequency, hematuria, penile pain, penile swelling, scrotal swelling, testicular pain and urgency.    Musculoskeletal:  Negative for arthralgias and back pain.   Neurological:  Negative for dizziness and light-headedness.   Psychiatric/Behavioral:  Negative for behavioral problems and confusion. The patient is not nervous/anxious.    All other systems reviewed and are negative.    Objective:      Vitals:    06/21/23 1552   Weight: 103 kg (227 lb 1.2 oz)           Physical Exam  Vitals and nursing note reviewed.   Constitutional:       Appearance: He is well-developed.   HENT:      Head: Normocephalic.   Eyes:      Conjunctiva/sclera: Conjunctivae normal.   Neck:      Thyroid: No thyromegaly.      Trachea: No tracheal deviation.   Cardiovascular:      Rate and Rhythm: Normal rate.      Heart sounds: Normal heart sounds.   Pulmonary:      Effort: Pulmonary effort is normal. No respiratory distress.      Breath sounds: Normal breath sounds. No wheezing.   Abdominal:      General: Bowel sounds are normal.      Palpations: Abdomen is soft.      Tenderness: There is no abdominal tenderness. There is no rebound.      Hernia: No hernia is present.   Musculoskeletal:         General: No tenderness. Normal range of motion.      Cervical back: Normal range of motion and neck supple.   Lymphadenopathy:      Cervical: No cervical adenopathy.   Skin:     General: Skin is warm and dry.      Findings: No erythema or rash.   Neurological:      Mental Status: He is alert and oriented to person, place, and time.   Psychiatric:         Behavior: Behavior normal.         Thought Content: Thought content normal.         Judgment: Judgment normal.       Urine dipstick shows not done.  Micro exam: not done.         Assessment:       1. Erectile dysfunction, unspecified erectile dysfunction type    2. Allergic rhinitis due to other allergic trigger, unspecified seasonality    3. Retrograde ejaculation    4. Nocturia                Plan:       1. Erectile dysfunction, unspecified erectile dysfunction type    - sildenafiL (VIAGRA) 100 MG  tablet; Take 1 tablet (100 mg total) by mouth daily as needed for Erectile Dysfunction.  Dispense: 15 tablet; Refill: 11    2. Allergic rhinitis due to other allergic trigger, unspecified seasonality    - pseudoephedrine (SUDAFED) 30 MG tablet; Take 1 tablet (30 mg total) by mouth daily as needed for Congestion.  Dispense: 30 tablet; Refill: 5    3. Retrograde ejaculation    - pseudoephedrine (SUDAFED) 30 MG tablet; Take 1 tablet (30 mg total) by mouth daily as needed for Congestion.  Dispense: 30 tablet; Refill: 5    4. Nocturia    - oxybutynin (DITROPAN-XL) 5 MG TR24; Take 1 tablet (5 mg total) by mouth once daily.  Dispense: 30 tablet; Refill: 11           Follow up in about 6 months (around 12/21/2023) for Follow up Established.

## 2023-06-26 ENCOUNTER — PATIENT MESSAGE (OUTPATIENT)
Dept: FAMILY MEDICINE | Facility: CLINIC | Age: 48
End: 2023-06-26
Payer: COMMERCIAL

## 2023-07-19 ENCOUNTER — CLINICAL SUPPORT (OUTPATIENT)
Dept: ENDOSCOPY | Facility: HOSPITAL | Age: 48
End: 2023-07-19
Attending: INTERNAL MEDICINE
Payer: COMMERCIAL

## 2023-07-19 DIAGNOSIS — Z12.11 SCREENING FOR COLON CANCER: ICD-10-CM

## 2023-07-19 RX ORDER — SODIUM, POTASSIUM,MAG SULFATES 17.5-3.13G
1 SOLUTION, RECONSTITUTED, ORAL ORAL DAILY
Qty: 1 KIT | Refills: 0 | Status: SHIPPED | OUTPATIENT
Start: 2023-07-19 | End: 2023-07-21

## 2023-07-19 NOTE — PLAN OF CARE
Spoke to pt to schedule procedure(s) Colonoscopy       Physician to perform procedure(s) Dr. STEVE Peck  Date of Procedure (s) 10/2/23  Arrival Time 9:30 AM  Time of Procedure(s) 10:30 AM   Location of Procedure(s) [address]  Type of Rx Prep sent to patient: Suprep  Instructions provided to patient via [facility]    Patient was informed on the following information and verbalized understanding. Screening questionnaire reviewed with patient and complete. If procedure requires anesthesia, a responsible adult needs to be present to accompany the patient home, patient cannot drive after receiving anesthesia. Appointment details are tentative, especially check-in time. Patient will receive a prep-op call 4 days prior to confirm check-in time for procedure. If applicable the patient should contact their pharmacy to verify Rx for procedure prep is ready for pick-up. Patient was advised to call the scheduling department at [phone] if pharmacy states no Rx is available. Patient was advised to call the endoscopy scheduling department if any questions or concerns arise.      [facility] Scheduling Department

## 2023-09-29 ENCOUNTER — TELEPHONE (OUTPATIENT)
Dept: GASTROENTEROLOGY | Facility: CLINIC | Age: 48
End: 2023-09-29
Payer: COMMERCIAL

## 2023-09-29 NOTE — TELEPHONE ENCOUNTER
----- Message from Eliazar Merrill sent at 9/29/2023 10:58 AM CDT -----  Regarding: Arrival time needed  Contact: [phone]  Pt is calling to speak with someone in provider office is asking for a return call please call pt at  [phone] to discuss the arrival time.

## 2023-09-29 NOTE — TELEPHONE ENCOUNTER
MA returned a call to Mrs. Perera. Arrival time provided and all questions answered to her satisfaction.

## 2023-10-01 ENCOUNTER — ANESTHESIA EVENT (OUTPATIENT)
Dept: ENDOSCOPY | Facility: HOSPITAL | Age: 48
End: 2023-10-01
Payer: COMMERCIAL

## 2023-10-02 ENCOUNTER — TELEPHONE (OUTPATIENT)
Dept: ENDOSCOPY | Facility: HOSPITAL | Age: 48
End: 2023-10-02
Payer: COMMERCIAL

## 2023-10-02 ENCOUNTER — PATIENT MESSAGE (OUTPATIENT)
Dept: ENDOSCOPY | Facility: HOSPITAL | Age: 48
End: 2023-10-02
Payer: COMMERCIAL

## 2023-10-02 ENCOUNTER — ANESTHESIA (OUTPATIENT)
Dept: ENDOSCOPY | Facility: HOSPITAL | Age: 48
End: 2023-10-02
Payer: COMMERCIAL

## 2023-10-02 ENCOUNTER — HOSPITAL ENCOUNTER (OUTPATIENT)
Facility: HOSPITAL | Age: 48
Discharge: HOME | End: 2023-10-02
Attending: INTERNAL MEDICINE | Admitting: INTERNAL MEDICINE
Payer: COMMERCIAL

## 2023-10-02 VITALS
RESPIRATION RATE: 19 BRPM | HEART RATE: 76 BPM | TEMPERATURE: 97 F | SYSTOLIC BLOOD PRESSURE: 112 MMHG | OXYGEN SATURATION: 97 % | DIASTOLIC BLOOD PRESSURE: 77 MMHG

## 2023-10-02 DIAGNOSIS — Z12.11 COLON CANCER SCREENING: ICD-10-CM

## 2023-10-02 DIAGNOSIS — Z12.11 COLON CANCER SCREENING: Primary | ICD-10-CM

## 2023-10-02 PROCEDURE — D9220A PRA ANESTHESIA: ICD-10-PCS | Mod: CRNA,,, | Performed by: NURSE ANESTHETIST, CERTIFIED REGISTERED

## 2023-10-02 PROCEDURE — D9220A PRA ANESTHESIA: ICD-10-PCS | Mod: ANES,,, | Performed by: ANESTHESIOLOGY

## 2023-10-02 PROCEDURE — G0121 COLON CA SCRN NOT HI RSK IND: HCPCS | Mod: 53,,, | Performed by: INTERNAL MEDICINE

## 2023-10-02 PROCEDURE — 25000003 PHARM REV CODE 250: Performed by: ANESTHESIOLOGY

## 2023-10-02 PROCEDURE — 63600175 PHARM REV CODE 636 W HCPCS: Performed by: NURSE ANESTHETIST, CERTIFIED REGISTERED

## 2023-10-02 PROCEDURE — D9220A PRA ANESTHESIA: Mod: CRNA,,, | Performed by: NURSE ANESTHETIST, CERTIFIED REGISTERED

## 2023-10-02 PROCEDURE — G0121 COLON CA SCRN NOT HI RSK IND: ICD-10-PCS | Mod: 53,,, | Performed by: INTERNAL MEDICINE

## 2023-10-02 PROCEDURE — G0121 COLON CA SCRN NOT HI RSK IND: HCPCS | Mod: 74 | Performed by: INTERNAL MEDICINE

## 2023-10-02 PROCEDURE — 37000009 HC ANESTHESIA EA ADD 15 MINS: Performed by: INTERNAL MEDICINE

## 2023-10-02 PROCEDURE — 25000003 PHARM REV CODE 250: Performed by: NURSE ANESTHETIST, CERTIFIED REGISTERED

## 2023-10-02 PROCEDURE — D9220A PRA ANESTHESIA: Mod: ANES,,, | Performed by: ANESTHESIOLOGY

## 2023-10-02 PROCEDURE — 37000008 HC ANESTHESIA 1ST 15 MINUTES: Performed by: INTERNAL MEDICINE

## 2023-10-02 RX ORDER — SODIUM, POTASSIUM,MAG SULFATES 17.5-3.13G
1 SOLUTION, RECONSTITUTED, ORAL ORAL DAILY
Qty: 1 KIT | Refills: 0 | Status: SHIPPED | OUTPATIENT
Start: 2023-10-02 | End: 2023-10-04

## 2023-10-02 RX ORDER — LIDOCAINE HYDROCHLORIDE 20 MG/ML
INJECTION INTRAVENOUS
Status: DISCONTINUED | OUTPATIENT
Start: 2023-10-02 | End: 2023-10-02

## 2023-10-02 RX ORDER — SODIUM CHLORIDE 9 MG/ML
INJECTION, SOLUTION INTRAVENOUS CONTINUOUS
Status: DISCONTINUED | OUTPATIENT
Start: 2023-10-02 | End: 2023-10-02 | Stop reason: HOSPADM

## 2023-10-02 RX ORDER — LIDOCAINE HYDROCHLORIDE 10 MG/ML
INJECTION, SOLUTION EPIDURAL; INFILTRATION; INTRACAUDAL; PERINEURAL
Status: DISCONTINUED
Start: 2023-10-02 | End: 2023-10-02 | Stop reason: HOSPADM

## 2023-10-02 RX ORDER — PROPOFOL 10 MG/ML
VIAL (ML) INTRAVENOUS
Status: DISCONTINUED | OUTPATIENT
Start: 2023-10-02 | End: 2023-10-02

## 2023-10-02 RX ORDER — LIDOCAINE HYDROCHLORIDE 10 MG/ML
1 INJECTION, SOLUTION EPIDURAL; INFILTRATION; INTRACAUDAL; PERINEURAL ONCE
Status: DISCONTINUED | OUTPATIENT
Start: 2023-10-02 | End: 2023-10-02 | Stop reason: HOSPADM

## 2023-10-02 RX ORDER — PROPOFOL 10 MG/ML
INJECTION, EMULSION INTRAVENOUS
Status: DISCONTINUED
Start: 2023-10-02 | End: 2023-10-02 | Stop reason: HOSPADM

## 2023-10-02 RX ADMIN — LIDOCAINE HYDROCHLORIDE 50 MG: 20 INJECTION, SOLUTION INTRAVENOUS at 10:10

## 2023-10-02 RX ADMIN — SODIUM CHLORIDE: 0.9 INJECTION, SOLUTION INTRAVENOUS at 10:10

## 2023-10-02 RX ADMIN — PROPOFOL 100 MG: 10 INJECTION, EMULSION INTRAVENOUS at 10:10

## 2023-10-02 NOTE — TELEPHONE ENCOUNTER
Colonoscopy 10/02, poor prep, reschedule repeat Screening Colonoscopy.Patient is in Endoscopy recovery, Instructions faxed to [facility] and sent to patient portal

## 2023-10-02 NOTE — H&P
Short Stay Endoscopy History and Physical    PCP - Mark Anthony Hogan MD    Procedure - Colonoscopy  ASA - per anesthesia  Mallampati - per anesthesia  History of Anesthesia problems - no  Family history Anesthesia problems - no   Plan of anesthesia - General, MAC    HPI:  This is a 48 y.o. male here for evaluation of : asymptomatic screening exam      ROS:  Constitutional: No fevers, chills, No weight loss  CV: No chest pain  Pulm: No cough, No shortness of breath  GI: see HPI  Derm: No rash    Medical History:  has a past medical history of Hypertension (2/26/2015), Insomnia, and Kidney stones.    Surgical History:  has no past surgical history on file.    Family History: family history includes Hypertension in his mother.. Otherwise no colon cancer, inflammatory bowel disease, or GI malignancies.    Social History:  reports that he has never smoked. He has never used smokeless tobacco. He reports current alcohol use. He reports that he does not use drugs.    Review of patient's allergies indicates:  No Known Allergies    Medications:   Medications Prior to Admission   Medication Sig Dispense Refill Last Dose    amitriptyline (ELAVIL) 10 MG tablet Take 1 tablet (10 mg total) by mouth nightly as needed for Insomnia. (Patient not taking: Reported on 6/21/2023) 90 tablet 3     hydroCHLOROthiazide (HYDRODIURIL) 12.5 MG Tab Take 1 tablet (12.5 mg total) by mouth once daily. 90 tablet 3     meloxicam (MOBIC) 15 MG tablet TAKE 1 TABLET(15 MG) BY MOUTH EVERY DAY 30 tablet 0     oxybutynin (DITROPAN-XL) 5 MG TR24 Take 1 tablet (5 mg total) by mouth once daily. 30 tablet 11     pantoprazole (PROTONIX) 20 MG tablet Take 1 tablet (20 mg total) by mouth daily as needed (heartburn). 90 tablet 3     pseudoephedrine (SUDAFED) 30 MG tablet Take 1 tablet (30 mg total) by mouth daily as needed for Congestion. 30 tablet 5     sildenafiL (VIAGRA) 100 MG tablet Take 1 tablet (100 mg total) by mouth daily as needed for Erectile  Dysfunction. 15 tablet 11     triamcinolone acetonide 0.1% (KENALOG) 0.1 % cream Apply topically 2 (two) times daily. for 14 days 45 g 0          Physical Exam:    Vital Signs: There were no vitals filed for this visit.    Gen: NAD, lying comfortably  HENT: NCAT, oropharynx clear  Eyes: anicteric sclerae, EOMI grossly  Neck: supple, no visible masses/goiter  Cardiac: RRR  Lungs: non-labored breathing  Abd: soft, NT/ND, normoactive BS  Ext: no LE edema, warm, well perfused  Skin: skin intact on exposed body parts, no visible rashes, lesions  Neuro: A&Ox4, neuro exam grossly intact, moves all extremities  Psych: appropriate mood, affect        Labs:  Lab Results   Component Value Date    WBC 4.04 03/18/2023    HGB 15.5 03/18/2023    HCT 47.5 03/18/2023     03/18/2023    CHOL 192 03/18/2023    TRIG 54 03/18/2023    HDL 53 03/18/2023    ALT 32 03/18/2023    AST 23 03/18/2023     03/18/2023    K 4.5 03/18/2023     03/18/2023    CREATININE 1.3 03/18/2023    BUN 15 03/18/2023    CO2 30 (H) 03/18/2023    TSH 2.371 03/25/2013    PSA 0.40 02/12/2022       Plan:  Colonoscopy     I have explained the risks and benefits of endoscopy procedures to the patient including but not limited to bleeding, perforation, infection, and death.  The patient was asked if they understand and allowed to ask any further questions to their satisfaction.    Elgin Peck MD

## 2023-10-02 NOTE — PROVATION PATIENT INSTRUCTIONS
Discharge Summary/Instructions after an Endoscopic Procedure  Patient Name: Brad Perera  Patient MRN: [id number]  Patient YOB: 1975  Monday, October 2, 2023  Elgin Peck MD  Dear patient,  As a result of recent federal legislation (The Federal Cures Act), you may   receive lab or pathology results from your procedure in your [facility]   account before your physician is able to contact you. Your physician or   their representative will relay the results to you with their   recommendations at their soonest availability.  Thank you,  RESTRICTIONS:  During your procedure today, you received medications for sedation.  These   medications may affect your judgment, balance and coordination.  Therefore,   for 24 hours, you have the following restrictions:   - DO NOT drive a car, operate machinery, make legal/financial decisions,   sign important papers or drink alcohol.    ACTIVITY:  Today: no heavy lifting, straining or running due to procedural   sedation/anesthesia.  The following day: return to full activity including work.  DIET:  Eat and drink normally unless instructed otherwise.     TREATMENT FOR COMMON SIDE EFFECTS:  - Mild abdominal pain, nausea, belching, bloating or excessive gas:  rest,   eat lightly and use a heating pad.  - Sore Throat: treat with throat lozenges and/or gargle with warm salt   water.  - Because air was used during the procedure, expelling large amounts of air   from your rectum or belching is normal.  - If a bowel prep was taken, you may not have a bowel movement for 1-3 days.    This is normal.  SYMPTOMS TO WATCH FOR AND REPORT TO YOUR PHYSICIAN:  1. Abdominal pain or bloating, other than gas cramps.  2. Chest pain.  3. Back pain.  4. Signs of infection such as: chills or fever occurring within 24 hours   after the procedure.  5. Rectal bleeding, which would show as bright red, maroon, or black stools.   (A tablespoon of blood from the rectum is not serious, especially if    hemorrhoids are present.)  6. Vomiting.  7. Weakness or dizziness.  GO DIRECTLY TO THE NEAREST EMERGENCY ROOM IF YOU HAVE ANY OF THE FOLLOWING:      Difficulty breathing              Chills and/or fever over 101 F   Persistent vomiting and/or vomiting blood   Severe abdominal pain   Severe chest pain   Black, tarry stools   Bleeding- more than one tablespoon   Any other symptom or condition that you feel may need urgent attention  Your doctor recommends these additional instructions:  If any biopsies were taken, your doctors clinic will contact you in 1 to 2   weeks with any results.  - Discharge patient to home.   - Resume previous diet.   - Continue present medications.   - Repeat colonoscopy at the next available appointment for surveillance.  For questions, problems or results please call your physician - Elgin Peck MD at Work:  [phone].  [facility] can be reached at [phone]     IF A COMPLICATION OR EMERGENCY SITUATION ARISES AND YOU ARE UNABLE TO REACH   YOUR PHYSICIAN - GO DIRECTLY TO THE EMERGENCY ROOM.  Elgin Peck MD  10/2/2023 10:54:30 AM  This report has been verified and signed electronically.  Dear patient,  As a result of recent federal legislation (The Federal Cures Act), you may   receive lab or pathology results from your procedure in your [facility]   account before your physician is able to contact you. Your physician or   their representative will relay the results to you with their   recommendations at their soonest availability.  Thank you,  PROVATION

## 2023-10-02 NOTE — TRANSFER OF CARE
Anesthesia Transfer of Care Note    Patient: Brad Perera    Procedure(s) Performed: Procedure(s) (LRB):  COLONOSCOPY (N/A)    Patient location: GI    Anesthesia Type: general    Transport from OR: Transported from OR on room air with adequate spontaneous ventilation    Post pain: adequate analgesia    Post assessment: no apparent anesthetic complications and tolerated procedure well    Post vital signs: stable    Level of consciousness: awake    Nausea/Vomiting: no nausea/vomiting    Complications: none    Transfer of care protocol was followed      Last vitals:   Visit Vitals  /84   Pulse 90   Temp 36.1 °C (97 °F)   Resp 18   SpO2 97%

## 2023-10-02 NOTE — TELEPHONE ENCOUNTER
"----- Message from Elgin Peck MD sent at 10/2/2023 10:57 AM CDT -----  Procedure: Colonoscopy    Diagnosis: Screening colonoscopy    Procedure Timin-4 weeks    #If within 4 weeks selected, please jluis as high priority#    #If greater than 12 weeks, please select "5-12 weeks" and delay sending until 2 months prior to requested date#     Provider: Any GI provider    Location:  Endo    Additional Scheduling Information: No scheduling concerns    Prep Specifications:Standard prep    Is the patient taking a GLP-1 Agonist:no    Have you attached a patient to this message: yes     "

## 2023-10-02 NOTE — ANESTHESIA POSTPROCEDURE EVALUATION
Anesthesia Post Evaluation    Patient: Brad Perera    Procedure(s) Performed: Procedure(s) (LRB):  COLONOSCOPY (N/A)    Final Anesthesia Type: general      Patient location during evaluation: PACU  Patient participation: Yes- Able to Participate  Level of consciousness: awake and alert  Post-procedure vital signs: reviewed and stable  Pain management: adequate  Airway patency: patent    PONV status at discharge: No PONV  Anesthetic complications: no      Cardiovascular status: blood pressure returned to baseline  Respiratory status: unassisted  Hydration status: euvolemic            Vitals Value Taken Time   /72 10/02/23 1111   Temp 36.1 °C (97 °F) 10/02/23 1056   Pulse 79 10/02/23 1111   Resp 19 10/02/23 1111   SpO2 98 % 10/02/23 1111         No case tracking events are documented in the log.      Pain/Juliano Score: Juliano Score: 10 (10/2/2023 11:11 AM)

## 2023-10-02 NOTE — PLAN OF CARE
Procedure and recovery complete. Awake and alert. No c/o pain or discomfort. Wife at bedside. Discharge instructions given. Instructions given for rescheduled colonoscopy. Verbalized understanding. No acute distress noted.

## 2023-10-19 ENCOUNTER — PATIENT OUTREACH (OUTPATIENT)
Dept: ADMINISTRATIVE | Facility: HOSPITAL | Age: 48
End: 2023-10-19
Payer: COMMERCIAL

## 2023-11-20 DIAGNOSIS — N52.9 ERECTILE DYSFUNCTION, UNSPECIFIED ERECTILE DYSFUNCTION TYPE: ICD-10-CM

## 2023-11-20 RX ORDER — SILDENAFIL 100 MG/1
100 TABLET, FILM COATED ORAL DAILY PRN
Qty: 15 TABLET | Refills: 11 | Status: SHIPPED | OUTPATIENT
Start: 2023-11-20 | End: 2023-12-19 | Stop reason: SDUPTHER

## 2023-12-19 ENCOUNTER — OFFICE VISIT (OUTPATIENT)
Dept: UROLOGY | Facility: CLINIC | Age: 48
End: 2023-12-19
Payer: COMMERCIAL

## 2023-12-19 DIAGNOSIS — N52.9 ERECTILE DYSFUNCTION, UNSPECIFIED ERECTILE DYSFUNCTION TYPE: ICD-10-CM

## 2023-12-19 DIAGNOSIS — R35.1 NOCTURIA: ICD-10-CM

## 2023-12-19 DIAGNOSIS — J30.89 ALLERGIC RHINITIS DUE TO OTHER ALLERGIC TRIGGER, UNSPECIFIED SEASONALITY: ICD-10-CM

## 2023-12-19 DIAGNOSIS — N53.14 RETROGRADE EJACULATION: Primary | ICD-10-CM

## 2023-12-19 PROCEDURE — 99999 PR PBB SHADOW E&M-EST. PATIENT-LVL III: ICD-10-PCS | Mod: PBBFAC,,, | Performed by: UROLOGY

## 2023-12-19 PROCEDURE — 1159F PR MEDICATION LIST DOCUMENTED IN MEDICAL RECORD: ICD-10-PCS | Mod: CPTII,S$GLB,, | Performed by: UROLOGY

## 2023-12-19 PROCEDURE — 99213 OFFICE O/P EST LOW 20 MIN: CPT | Mod: S$GLB,,, | Performed by: UROLOGY

## 2023-12-19 PROCEDURE — 1159F MED LIST DOCD IN RCRD: CPT | Mod: CPTII,S$GLB,, | Performed by: UROLOGY

## 2023-12-19 PROCEDURE — 1160F PR REVIEW ALL MEDS BY PRESCRIBER/CLIN PHARMACIST DOCUMENTED: ICD-10-PCS | Mod: CPTII,S$GLB,, | Performed by: UROLOGY

## 2023-12-19 PROCEDURE — 99213 PR OFFICE/OUTPT VISIT, EST, LEVL III, 20-29 MIN: ICD-10-PCS | Mod: S$GLB,,, | Performed by: UROLOGY

## 2023-12-19 PROCEDURE — 1160F RVW MEDS BY RX/DR IN RCRD: CPT | Mod: CPTII,S$GLB,, | Performed by: UROLOGY

## 2023-12-19 PROCEDURE — 99999 PR PBB SHADOW E&M-EST. PATIENT-LVL III: CPT | Mod: PBBFAC,,, | Performed by: UROLOGY

## 2023-12-19 RX ORDER — SILDENAFIL 100 MG/1
100 TABLET, FILM COATED ORAL DAILY PRN
Qty: 15 TABLET | Refills: 11 | Status: SHIPPED | OUTPATIENT
Start: 2023-12-19 | End: 2024-03-19 | Stop reason: SDUPTHER

## 2023-12-19 RX ORDER — PSEUDOEPHEDRINE HCL 30 MG
30 TABLET ORAL DAILY PRN
Qty: 30 TABLET | Refills: 5 | Status: SHIPPED | OUTPATIENT
Start: 2023-12-19 | End: 2024-03-19 | Stop reason: SDUPTHER

## 2023-12-19 NOTE — PROGRESS NOTES
Subjective:       Patient ID: Brad Perera is a 48 y.o. male The patient's last visit with me was on 6/21/2023.     Chief Complaint:   No chief complaint on file.    Difficulty Ejaculating  He has had issues for the past 1.5 years with no semen coming out during orgasm.  He denies any trauma or STD.  He does not take any alpha blockers.  He denies issues with orgasm but no semen.  He denies erection issues.    4/3/2023  He took Sudafed but he took it in the morning.  He did not feel that it helps.    5/9/2023  He is still having this issue.    6/21/2023  He still has issues.  He feels the Sudafed helps minimally.  He still voids out his semen afterwards.    12/19/2023  He continues to have retrograde ejaculation.  He feels he may take the sudafed too early.  It is several hours before.    Benign Prostatic Hypertrophy  Patient complains of lower urinary tract symptoms. He reports frequency, nocturia four times a night and urgency. He denies weak stream. Patient states symptoms are of moderate severity. Onset of symptoms was several years ago and was gradual in onset. His AUA Symptom Score is, 10/3 manifested as irritative symptoms including frequency, nocturia. He has no personal history and no family history of prostate cancer. He reports a history of no complicating symptoms. He denies flank pain, gross hematuria, kidney stones and recurrent UTI.     4/3/2023  He would like to try a medication to help with nocturia.  IPSS 20/3    5/9/2023  He did not feel Uroxatral help.  He has nocturia x 5.      6/21/2023  He was changed to Oxybutynin last visit.  His nocturia is improved to 2.    12/19/2023  Oxybutynin has helped.        ACTIVE MEDICAL ISSUES:  Patient Active Problem List   Diagnosis    Other insomnia    Obesity, Class I, BMI 30-34.9    Acute pain of right shoulder    Stiffness of right shoulder joint    Weakness of shoulder    Essential hypertension       PAST MEDICAL HISTORY  Past Medical History:    Diagnosis Date    Hypertension 2/26/2015    Insomnia     Kidney stones     h/o kidney stones       PAST SURGICAL HISTORY:  Past Surgical History:   Procedure Laterality Date    COLONOSCOPY N/A 10/2/2023    Procedure: COLONOSCOPY;  Surgeon: Elgin Peck MD;  Location: [facility];  Service: Endoscopy;  Laterality: N/A;  Suprep  instr. to portal.Referral Micheal PRATT       SOCIAL HISTORY:  Social History     Tobacco Use    Smoking status: Never    Smokeless tobacco: Never   Substance Use Topics    Alcohol use: Yes    Drug use: No       FAMILY HISTORY:  Family History   Problem Relation Age of Onset    Hypertension Mother        ALLERGIES AND MEDICATIONS: updated and reviewed.  Review of patient's allergies indicates:  No Known Allergies  Current Outpatient Medications   Medication Sig    amitriptyline (ELAVIL) 10 MG tablet Take 1 tablet (10 mg total) by mouth nightly as needed for Insomnia.    hydroCHLOROthiazide (HYDRODIURIL) 12.5 MG Tab Take 1 tablet (12.5 mg total) by mouth once daily.    meloxicam (MOBIC) 15 MG tablet TAKE 1 TABLET(15 MG) BY MOUTH EVERY DAY    oxybutynin (DITROPAN-XL) 5 MG TR24 Take 1 tablet (5 mg total) by mouth once daily.    pantoprazole (PROTONIX) 20 MG tablet Take 1 tablet (20 mg total) by mouth daily as needed (heartburn).    pseudoephedrine (SUDAFED) 30 MG tablet Take 1 tablet (30 mg total) by mouth daily as needed for Congestion.    sildenafiL (VIAGRA) 100 MG tablet Take 1 tablet (100 mg total) by mouth daily as needed for Erectile Dysfunction.    triamcinolone acetonide 0.1% (KENALOG) 0.1 % cream Apply topically 2 (two) times daily. for 14 days     No current facility-administered medications for this visit.       Review of Systems   Constitutional:  Negative for activity change, fatigue, fever and unexpected weight change.   HENT:  Negative for congestion.    Eyes:  Negative for redness.   Respiratory:  Negative for chest tightness and shortness of breath.    Cardiovascular:   Negative for chest pain and leg swelling.   Gastrointestinal:  Negative for abdominal pain, constipation, diarrhea, nausea and vomiting.   Genitourinary:  Negative for dysuria, flank pain, frequency, hematuria, penile pain, penile swelling, scrotal swelling, testicular pain and urgency.   Musculoskeletal:  Negative for arthralgias and back pain.   Neurological:  Negative for dizziness and light-headedness.   Psychiatric/Behavioral:  Negative for behavioral problems and confusion. The patient is not nervous/anxious.    All other systems reviewed and are negative.      Objective:      There were no vitals filed for this visit.          Physical Exam  Vitals and nursing note reviewed.   Constitutional:       Appearance: He is well-developed.   HENT:      Head: Normocephalic.   Eyes:      Conjunctiva/sclera: Conjunctivae normal.   Neck:      Thyroid: No thyromegaly.      Trachea: No tracheal deviation.   Cardiovascular:      Rate and Rhythm: Normal rate.      Heart sounds: Normal heart sounds.   Pulmonary:      Effort: Pulmonary effort is normal. No respiratory distress.      Breath sounds: Normal breath sounds. No wheezing.   Abdominal:      General: Bowel sounds are normal.      Palpations: Abdomen is soft.      Tenderness: There is no abdominal tenderness. There is no rebound.      Hernia: No hernia is present.   Musculoskeletal:         General: No tenderness. Normal range of motion.      Cervical back: Normal range of motion and neck supple.   Lymphadenopathy:      Cervical: No cervical adenopathy.   Skin:     General: Skin is warm and dry.      Findings: No erythema or rash.   Neurological:      Mental Status: He is alert and oriented to person, place, and time.   Psychiatric:         Behavior: Behavior normal.         Thought Content: Thought content normal.         Judgment: Judgment normal.         Urine dipstick shows not done.  Micro exam: not done.         Assessment:       1. Retrograde ejaculation    2.  Erectile dysfunction, unspecified erectile dysfunction type    3. Allergic rhinitis due to other allergic trigger, unspecified seasonality    4. Nocturia                  Plan:       1. Erectile dysfunction, unspecified erectile dysfunction type    - sildenafiL (VIAGRA) 100 MG tablet; Take 1 tablet (100 mg total) by mouth daily as needed for Erectile Dysfunction.  Dispense: 15 tablet; Refill: 11    2. Allergic rhinitis due to other allergic trigger, unspecified seasonality    - pseudoephedrine (SUDAFED) 30 MG tablet; Take 1 tablet (30 mg total) by mouth daily as needed for Congestion.  Dispense: 30 tablet; Refill: 5    3. Retrograde ejaculation    - pseudoephedrine (SUDAFED) 30 MG tablet; Take 1 tablet (30 mg total) by mouth daily as needed for Congestion.  Dispense: 30 tablet; Refill: 5    4. Nocturia    - US Retroperitoneal Complete; Future       Follow up in about 3 months (around 3/19/2024) for Follow up Established, Review X-ray.

## 2024-01-02 ENCOUNTER — TELEPHONE (OUTPATIENT)
Dept: ENDOSCOPY | Facility: HOSPITAL | Age: 49
End: 2024-01-02
Payer: COMMERCIAL

## 2024-01-08 ENCOUNTER — TELEPHONE (OUTPATIENT)
Dept: ENDOSCOPY | Facility: HOSPITAL | Age: 49
End: 2024-01-08
Payer: COMMERCIAL

## 2024-01-08 NOTE — TELEPHONE ENCOUNTER
Pt called to report he could not get off work today for procedure. He will call back when he has work schedule to r/s his colonoscopy.

## 2024-01-23 RX ORDER — MELOXICAM 15 MG/1
15 TABLET ORAL DAILY
Qty: 90 TABLET | Refills: 0 | Status: SHIPPED | OUTPATIENT
Start: 2024-01-23 | End: 2024-03-19 | Stop reason: SDUPTHER

## 2024-01-23 NOTE — TELEPHONE ENCOUNTER
Care Due:                  Date            Visit Type   Department     Provider  --------------------------------------------------------------------------------                                EP -         [facility] FAMILY                              PRIMARY      MED/ INTERNAL  Last Visit: 03-      CARE ([facility])   MED/ PEDS      Micheal Hugo                              EP                               PRIMARY      [facility] INTERNAL  Next Visit: 04-      CARE ([facility])   MEDICINE       Mark Anthony Hogan                                                            Last  Test          Frequency    Reason                     Performed    Due Date  --------------------------------------------------------------------------------    CBC.........  12 months..  meloxicam................  03- 03-    CMP.........  12 months..  hydroCHLOROthiazide,       03- 03-                             meloxicam................    [employer] Embedded Care Due Messages. Reference number: [id number].   1/23/2024 3:53:43 PM CST

## 2024-03-14 ENCOUNTER — HOSPITAL ENCOUNTER (OUTPATIENT)
Dept: RADIOLOGY | Facility: HOSPITAL | Age: 49
Discharge: HOME | End: 2024-03-14
Attending: UROLOGY
Payer: COMMERCIAL

## 2024-03-14 DIAGNOSIS — R35.1 NOCTURIA: ICD-10-CM

## 2024-03-14 PROCEDURE — 76770 US EXAM ABDO BACK WALL COMP: CPT | Mod: 26,,, | Performed by: RADIOLOGY

## 2024-03-14 PROCEDURE — 76770 US EXAM ABDO BACK WALL COMP: CPT | Mod: TC

## 2024-03-19 ENCOUNTER — OFFICE VISIT (OUTPATIENT)
Dept: UROLOGY | Facility: CLINIC | Age: 49
End: 2024-03-19
Payer: COMMERCIAL

## 2024-03-19 VITALS — WEIGHT: 226.88 LBS | BODY MASS INDEX: 32.55 KG/M2

## 2024-03-19 DIAGNOSIS — N40.0 BPH WITHOUT URINARY OBSTRUCTION: ICD-10-CM

## 2024-03-19 DIAGNOSIS — J30.89 ALLERGIC RHINITIS DUE TO OTHER ALLERGIC TRIGGER, UNSPECIFIED SEASONALITY: ICD-10-CM

## 2024-03-19 DIAGNOSIS — N52.9 ERECTILE DYSFUNCTION, UNSPECIFIED ERECTILE DYSFUNCTION TYPE: Primary | ICD-10-CM

## 2024-03-19 DIAGNOSIS — R35.1 NOCTURIA: ICD-10-CM

## 2024-03-19 DIAGNOSIS — N53.14 RETROGRADE EJACULATION: ICD-10-CM

## 2024-03-19 PROCEDURE — 99214 OFFICE O/P EST MOD 30 MIN: CPT | Mod: S$GLB,,, | Performed by: UROLOGY

## 2024-03-19 PROCEDURE — 1160F RVW MEDS BY RX/DR IN RCRD: CPT | Mod: CPTII,S$GLB,, | Performed by: UROLOGY

## 2024-03-19 PROCEDURE — 1159F MED LIST DOCD IN RCRD: CPT | Mod: CPTII,S$GLB,, | Performed by: UROLOGY

## 2024-03-19 PROCEDURE — 99999 PR PBB SHADOW E&M-EST. PATIENT-LVL III: CPT | Mod: PBBFAC,,, | Performed by: UROLOGY

## 2024-03-19 PROCEDURE — 3008F BODY MASS INDEX DOCD: CPT | Mod: CPTII,S$GLB,, | Performed by: UROLOGY

## 2024-03-19 RX ORDER — SILDENAFIL 100 MG/1
100 TABLET, FILM COATED ORAL DAILY PRN
Qty: 15 TABLET | Refills: 11 | Status: SHIPPED | OUTPATIENT
Start: 2024-03-19 | End: 2025-03-19

## 2024-03-19 RX ORDER — OXYBUTYNIN CHLORIDE 5 MG/1
5 TABLET, EXTENDED RELEASE ORAL DAILY
Qty: 30 TABLET | Refills: 11 | Status: SHIPPED | OUTPATIENT
Start: 2024-03-19 | End: 2025-03-19

## 2024-03-19 RX ORDER — PSEUDOEPHEDRINE HCL 30 MG
30 TABLET ORAL DAILY PRN
Qty: 30 TABLET | Refills: 5 | Status: SHIPPED | OUTPATIENT
Start: 2024-03-19

## 2024-03-19 NOTE — TELEPHONE ENCOUNTER
No care due was identified.  [facility] Embedded Care Due Messages. Reference number: [id number].   3/19/2024 3:27:35 PM CDT

## 2024-03-19 NOTE — TELEPHONE ENCOUNTER
Refill Routing Note   Medication(s) are not appropriate for processing by [facility] for the following reason(s):        Outside of protocol    [facility] action(s):  Route               Appointments  past 12m or future 3m with PCP    Date Provider   Last Visit   12/9/2021 Mark Anthony Hogan MD   Next Visit   4/17/2024 Mark Anthony Hogan MD   ED visits in past 90 days: 0        Note composed:3:42 PM 03/19/2024

## 2024-03-19 NOTE — PROGRESS NOTES
Subjective:       Patient ID: Brad Perera is a 49 y.o. male The patient's last visit with me was on 12/19/2023.     Chief Complaint:   No chief complaint on file.    Difficulty Ejaculating  He has had issues for the past 1.5 years with no semen coming out during orgasm.  He denies any trauma or STD.  He does not take any alpha blockers.  He denies issues with orgasm but no semen.  He denies erection issues.    4/3/2023  He took Sudafed but he took it in the morning.  He did not feel that it helps.    5/9/2023  He is still having this issue.    6/21/2023  He still has issues.  He feels the Sudafed helps minimally.  He still voids out his semen afterwards.    12/19/2023  He continues to have retrograde ejaculation.  He feels he may take the sudafed too early.  It is several hours before.    03/19/2024  He is about the same.  He feels the sudafed helps.      Benign Prostatic Hypertrophy  Patient complains of lower urinary tract symptoms. He reports frequency, nocturia four times a night and urgency. He denies weak stream. Patient states symptoms are of moderate severity. Onset of symptoms was several years ago and was gradual in onset. His AUA Symptom Score is, 10/3 manifested as irritative symptoms including frequency, nocturia. He has no personal history and no family history of prostate cancer. He reports a history of no complicating symptoms. He denies flank pain, gross hematuria, kidney stones and recurrent UTI.     4/3/2023  He would like to try a medication to help with nocturia.  IPSS 20/3    5/9/2023  He did not feel Uroxatral help.  He has nocturia x 5.      6/21/2023  He was changed to Oxybutynin last visit.  His nocturia is improved to 2.    03/19/2024  Oxybutynin has helped.        ACTIVE MEDICAL ISSUES:  Patient Active Problem List   Diagnosis    Other insomnia    Obesity, Class I, BMI 30-34.9    Acute pain of right shoulder    Stiffness of right shoulder joint    Weakness of shoulder     Essential hypertension       PAST MEDICAL HISTORY  Past Medical History:   Diagnosis Date    Hypertension 2/26/2015    Insomnia     Kidney stones     h/o kidney stones       PAST SURGICAL HISTORY:  Past Surgical History:   Procedure Laterality Date    COLONOSCOPY N/A 10/2/2023    Procedure: COLONOSCOPY;  Surgeon: Elgin Peck MD;  Location: [facility];  Service: Endoscopy;  Laterality: N/A;  Suprep  instr. to portal.Referral Micheal PRATT       SOCIAL HISTORY:  Social History     Tobacco Use    Smoking status: Never    Smokeless tobacco: Never   Substance Use Topics    Alcohol use: Yes    Drug use: No       FAMILY HISTORY:  Family History   Problem Relation Age of Onset    Hypertension Mother        ALLERGIES AND MEDICATIONS: updated and reviewed.  Review of patient's allergies indicates:  No Known Allergies  Current Outpatient Medications   Medication Sig    amitriptyline (ELAVIL) 10 MG tablet Take 1 tablet (10 mg total) by mouth nightly as needed for Insomnia.    hydroCHLOROthiazide (HYDRODIURIL) 12.5 MG Tab Take 1 tablet (12.5 mg total) by mouth once daily.    meloxicam (MOBIC) 15 MG tablet Take 1 tablet (15 mg total) by mouth once daily.    oxybutynin (DITROPAN-XL) 5 MG TR24 Take 1 tablet (5 mg total) by mouth once daily.    pantoprazole (PROTONIX) 20 MG tablet Take 1 tablet (20 mg total) by mouth daily as needed (heartburn).    pseudoephedrine (SUDAFED) 30 MG tablet Take 1 tablet (30 mg total) by mouth daily as needed for Congestion.    sildenafiL (VIAGRA) 100 MG tablet Take 1 tablet (100 mg total) by mouth daily as needed for Erectile Dysfunction.    triamcinolone acetonide 0.1% (KENALOG) 0.1 % cream Apply topically 2 (two) times daily. for 14 days     No current facility-administered medications for this visit.       Review of Systems   Constitutional:  Negative for activity change, fatigue, fever and unexpected weight change.   HENT:  Negative for congestion.    Eyes:  Negative for redness.    Respiratory:  Negative for chest tightness and shortness of breath.    Cardiovascular:  Negative for chest pain and leg swelling.   Gastrointestinal:  Negative for abdominal pain, constipation, diarrhea, nausea and vomiting.   Genitourinary:  Negative for dysuria, flank pain, frequency, hematuria, penile pain, penile swelling, scrotal swelling, testicular pain and urgency.   Musculoskeletal:  Negative for arthralgias and back pain.   Neurological:  Negative for dizziness and light-headedness.   Psychiatric/Behavioral:  Negative for behavioral problems and confusion. The patient is not nervous/anxious.    All other systems reviewed and are negative.      Objective:      Vitals:    03/19/24 1455   Weight: 102.9 kg (226 lb 13.7 oz)             Physical Exam  Vitals and nursing note reviewed.   Constitutional:       Appearance: He is well-developed.   HENT:      Head: Normocephalic.   Eyes:      Conjunctiva/sclera: Conjunctivae normal.   Neck:      Thyroid: No thyromegaly.      Trachea: No tracheal deviation.   Cardiovascular:      Rate and Rhythm: Normal rate.      Heart sounds: Normal heart sounds.   Pulmonary:      Effort: Pulmonary effort is normal. No respiratory distress.      Breath sounds: Normal breath sounds. No wheezing.   Abdominal:      General: Bowel sounds are normal.      Palpations: Abdomen is soft.      Tenderness: There is no abdominal tenderness. There is no rebound.      Hernia: No hernia is present.   Musculoskeletal:         General: No tenderness. Normal range of motion.      Cervical back: Normal range of motion and neck supple.   Lymphadenopathy:      Cervical: No cervical adenopathy.   Skin:     General: Skin is warm and dry.      Findings: No erythema or rash.   Neurological:      Mental Status: He is alert and oriented to person, place, and time.   Psychiatric:         Behavior: Behavior normal.         Thought Content: Thought content normal.         Judgment: Judgment normal.          Urine dipstick shows not done.  Micro exam: not done.     US Retroperitoneal Complete  Order: [id number]  Status: Final result       Visible to patient: Yes (seen)       Next appt: Today at 03:00 PM in Urology (Joe Andrew MD)       Dx: Nocturia    0 Result Notes  Details    Reading Physician Reading Date Result Priority   Yassine Dick MD  [phone] 3/14/2024 Routine     Narrative & Impression  EXAMINATION:  US RETROPERITONEAL COMPLETE     CLINICAL HISTORY:  Nocturia     TECHNIQUE:  Ultrasound of the kidneys and urinary bladder was performed including color flow and Doppler evaluation of the kidneys.     COMPARISON:  12/17/2021, renal stone study 03/26/2011     FINDINGS:  Right kidney: The right kidney measures 11.0 cm. No cortical thinning. No loss of corticomedullary distinction. Resistive index measures 0.48.  No mass. No renal stone. No hydronephrosis.     Left kidney: The left kidney measures 10.5 cm. No cortical thinning. No loss of corticomedullary distinction. Resistive index measures 0.57.  No mass. No renal stone. No hydronephrosis.     The bladder is partially distended at the time of scanning and has an unremarkable appearance.  Prevoid urinary bladder volume: 132 mL.  Postvoid residual: 65 mL     Impression:     No acute sonographic abnormalities of the kidneys.     Bladder prevoid and postvoid residual above.        Electronically signed by: Yassine Dick MD  Date:                                            03/14/2024  Time:                                           17:49           Exam Ended: 03/14/24 17:02 CDT             Assessment:       1. Erectile dysfunction, unspecified erectile dysfunction type    2. Retrograde ejaculation    3. Allergic rhinitis due to other allergic trigger, unspecified seasonality    4. Nocturia    5. BPH without urinary obstruction                    Plan:       1. Erectile dysfunction, unspecified erectile dysfunction type    - sildenafiL (VIAGRA)  100 MG tablet; Take 1 tablet (100 mg total) by mouth daily as needed for Erectile Dysfunction.  Dispense: 15 tablet; Refill: 11    2. Retrograde ejaculation    - pseudoephedrine (SUDAFED) 30 MG tablet; Take 1 tablet (30 mg total) by mouth daily as needed for Congestion.  Dispense: 30 tablet; Refill: 5    3. Allergic rhinitis due to other allergic trigger, unspecified seasonality    - pseudoephedrine (SUDAFED) 30 MG tablet; Take 1 tablet (30 mg total) by mouth daily as needed for Congestion.  Dispense: 30 tablet; Refill: 5    4. Nocturia  - oxybutynin (DITROPAN-XL) 5 MG TR24; Take 1 tablet (5 mg total) by mouth once daily.  Dispense: 30 tablet; Refill: 11    5. BPH without urinary obstruction    - Prostate Specific Antigen, Diagnostic; Future       Follow up in about 6 months (around 9/19/2024) for Review PSA.

## 2024-03-20 DIAGNOSIS — I10 ESSENTIAL HYPERTENSION: ICD-10-CM

## 2024-03-20 RX ORDER — MELOXICAM 15 MG/1
15 TABLET ORAL DAILY
Qty: 90 TABLET | Refills: 0 | Status: SHIPPED | OUTPATIENT
Start: 2024-03-20

## 2024-04-17 ENCOUNTER — OFFICE VISIT (OUTPATIENT)
Dept: INTERNAL MEDICINE | Facility: CLINIC | Age: 49
End: 2024-04-17
Payer: COMMERCIAL

## 2024-04-17 VITALS
OXYGEN SATURATION: 99 % | WEIGHT: 226.31 LBS | SYSTOLIC BLOOD PRESSURE: 116 MMHG | HEIGHT: 70 IN | DIASTOLIC BLOOD PRESSURE: 76 MMHG | HEART RATE: 64 BPM | BODY MASS INDEX: 32.4 KG/M2

## 2024-04-17 DIAGNOSIS — K59.01 SLOW TRANSIT CONSTIPATION: ICD-10-CM

## 2024-04-17 DIAGNOSIS — Z00.00 ROUTINE MEDICAL EXAM: Primary | ICD-10-CM

## 2024-04-17 DIAGNOSIS — Z79.899 ENCOUNTER FOR LONG-TERM CURRENT USE OF MEDICATION: ICD-10-CM

## 2024-04-17 DIAGNOSIS — K76.0 NAFL (NONALCOHOLIC FATTY LIVER): Chronic | ICD-10-CM

## 2024-04-17 DIAGNOSIS — G47.09 OTHER INSOMNIA: ICD-10-CM

## 2024-04-17 DIAGNOSIS — I10 ESSENTIAL HYPERTENSION: Chronic | ICD-10-CM

## 2024-04-17 DIAGNOSIS — R68.89 HEAT INTOLERANCE: ICD-10-CM

## 2024-04-17 DIAGNOSIS — E66.9 OBESITY, CLASS I, BMI 30-34.9: Chronic | ICD-10-CM

## 2024-04-17 PROCEDURE — 99213 OFFICE O/P EST LOW 20 MIN: CPT | Mod: 25,S$GLB,, | Performed by: INTERNAL MEDICINE

## 2024-04-17 PROCEDURE — 99999 PR PBB SHADOW E&M-EST. PATIENT-LVL IV: CPT | Mod: PBBFAC,,, | Performed by: INTERNAL MEDICINE

## 2024-04-17 PROCEDURE — 3078F DIAST BP <80 MM HG: CPT | Mod: CPTII,S$GLB,, | Performed by: INTERNAL MEDICINE

## 2024-04-17 PROCEDURE — 1159F MED LIST DOCD IN RCRD: CPT | Mod: CPTII,S$GLB,, | Performed by: INTERNAL MEDICINE

## 2024-04-17 PROCEDURE — 1160F RVW MEDS BY RX/DR IN RCRD: CPT | Mod: CPTII,S$GLB,, | Performed by: INTERNAL MEDICINE

## 2024-04-17 PROCEDURE — 3074F SYST BP LT 130 MM HG: CPT | Mod: CPTII,S$GLB,, | Performed by: INTERNAL MEDICINE

## 2024-04-17 PROCEDURE — 99396 PREV VISIT EST AGE 40-64: CPT | Mod: S$GLB,,, | Performed by: INTERNAL MEDICINE

## 2024-04-17 PROCEDURE — 3008F BODY MASS INDEX DOCD: CPT | Mod: CPTII,S$GLB,, | Performed by: INTERNAL MEDICINE

## 2024-04-17 RX ORDER — POLYETHYLENE GLYCOL 3350 17 G/17G
17 POWDER, FOR SOLUTION ORAL DAILY
COMMUNITY
Start: 2024-04-17

## 2024-04-17 NOTE — PROGRESS NOTES
Assessment & Plan  Problem List Items Addressed This Visit          Cardiac/Vascular    Essential hypertension (Chronic)    Current Assessment & Plan     On hydrochlorothiazide.  The current medical regimen is effective;  continue present plan and medications.             Endocrine    Obesity, Class I, BMI 30-34.9 (Chronic)    Current Assessment & Plan     The patient's BMI has been recorded in the chart. The patient has been provided educational materials regarding the benefits of attaining and maintaining a normal weight. We will continue to address and follow this issue during follow up visits.             Other    Other insomnia    Overview     Shift work         Current Assessment & Plan     Takes amitriptyline on the weekends. The current medical regimen is effective;  continue present plan and medications.           Other Visit Diagnoses       Routine medical exam    -  Primary  -   Discussed healthy diet, regular exercise, necessary labs, age appropriate cancer screening, and routine vaccinations.      Of note he was not able to have a full colonoscopy due to incomplete prep.  I counseled him that he needs to reschedule this    Relevant Orders    CBC Auto Differential    Comprehensive Metabolic Panel    Lipid Panel    Hemoglobin A1C    Heat intolerance    -  Check TSH    Relevant Orders    TSH    Encounter for long-term current use of medication    -  Check vitamin levels    Relevant Orders    Vitamin D    Vitamin B12    Slow transit constipation    -  Upon further discussion the patient reports that he is suffering with constipation.  I will start him on a stool softener regimen using MiraLax as he prefers powders mixed into a drink over tablets/capsules.  Again he needs to complete his colonoscopy    Relevant Medications    polyethylene glycol (MIRALAX) 17 gram/dose powder              Health Maintenance reviewed, eligible for COVID boost.    Follow-up: Follow up in about 1 year (around 4/17/2025) for  "Routine Physical.  ______________________________________________________________________    Chief Complaint  Chief Complaint   Patient presents with    Annual Exam       HPI  Brad Perera is a 49 y.o. male with medical diagnoses as listed in the medical history and problem list that presents for routine physical.  Pt is known to me with their last appointment March 20 23 xby1 of my partners.  I last saw the patient December 2021.      He is due for labs.    It was routine physical he reports he has been having "fever" in his abdomen.    He reports this sensation has been going on for roughly 1 month.  No nausea vomiting diarrhea.  He denies heartburn.  He is taking his pantoprazole almost days.  No other urinary habit changes.  He denies systemic fevers chills or night sweats.          ROS  Review of Systems   Constitutional:  Negative for chills, fever and unexpected weight change.   Respiratory:  Negative for cough, chest tightness, shortness of breath and wheezing.    Cardiovascular:  Negative for chest pain, palpitations and leg swelling.   Gastrointestinal:  Negative for abdominal pain and blood in stool.   Genitourinary:  Negative for decreased urine volume, dysuria and hematuria.   Musculoskeletal:  Negative for arthralgias and myalgias.   Neurological:  Negative for dizziness, weakness, light-headedness and headaches.   Psychiatric/Behavioral:  Negative for decreased concentration, dysphoric mood, sleep disturbance and suicidal ideas.            Physical Exam  Vitals:    04/17/24 1510   BP: 116/76   BP Location: Left arm   Patient Position: Sitting   BP Method: Medium (Manual)   Pulse: 64   SpO2: 99%   Weight: 102.7 kg (226 lb 4.8 oz)   Height: 5' 10" (1.778 m)    Body mass index is 32.47 kg/m².  Weight: 102.7 kg (226 lb 4.8 oz)   Height: 5' 10" (177.8 cm)   Physical Exam  Constitutional:       General: He is not in acute distress.     Appearance: He is well-developed.   HENT:      Head: " Normocephalic and atraumatic.   Eyes:      General: Lids are normal. No scleral icterus.     Conjunctiva/sclera: Conjunctivae normal.      Pupils: Pupils are equal, round, and reactive to light.   Neck:      Thyroid: No thyromegaly.      Vascular: No carotid bruit or JVD.   Cardiovascular:      Rate and Rhythm: Normal rate and regular rhythm.      Heart sounds: Normal heart sounds. No murmur heard.     No friction rub. No S3 or S4 sounds.   Pulmonary:      Effort: Pulmonary effort is normal.      Breath sounds: Normal breath sounds. No wheezing, rhonchi or rales.   Abdominal:      General: Bowel sounds are normal. There is no distension.      Palpations: Abdomen is soft.      Tenderness: There is abdominal tenderness in the right lower quadrant. There is no guarding or rebound. Negative signs include Rosales's sign and McBurney's sign.   Musculoskeletal:         General: No tenderness.      Cervical back: Full passive range of motion without pain and neck supple.      Right lower leg: No edema.      Left lower leg: No edema.   Skin:     General: Skin is warm and dry.      Findings: No rash.   Neurological:      Mental Status: He is alert and oriented to person, place, and time.   Psychiatric:         Speech: Speech normal.         Behavior: Behavior normal.         Thought Content: Thought content normal.

## 2024-04-17 NOTE — ASSESSMENT & PLAN NOTE
On hydrochlorothiazide.  The current medical regimen is effective;  continue present plan and medications.

## 2024-04-17 NOTE — ASSESSMENT & PLAN NOTE
Takes amitriptyline on the weekends. The current medical regimen is effective;  continue present plan and medications.

## 2024-04-20 ENCOUNTER — LAB VISIT (OUTPATIENT)
Dept: LAB | Facility: HOSPITAL | Age: 49
End: 2024-04-20
Attending: INTERNAL MEDICINE
Payer: COMMERCIAL

## 2024-04-20 DIAGNOSIS — R68.89 HEAT INTOLERANCE: ICD-10-CM

## 2024-04-20 DIAGNOSIS — Z00.00 ROUTINE MEDICAL EXAM: ICD-10-CM

## 2024-04-20 DIAGNOSIS — Z79.899 ENCOUNTER FOR LONG-TERM CURRENT USE OF MEDICATION: ICD-10-CM

## 2024-04-20 LAB
25(OH)D3+25(OH)D2 SERPL-MCNC: 15 NG/ML (ref 30–96)
ALBUMIN SERPL BCP-MCNC: 3.6 G/DL (ref 3.5–5.2)
ALP SERPL-CCNC: 127 U/L (ref 55–135)
ALT SERPL W/O P-5'-P-CCNC: 71 U/L (ref 10–44)
ANION GAP SERPL CALC-SCNC: 10 MMOL/L (ref 8–16)
AST SERPL-CCNC: 45 U/L (ref 10–40)
BASOPHILS # BLD AUTO: 0.01 K/UL (ref 0–0.2)
BASOPHILS NFR BLD: 0.3 % (ref 0–1.9)
BILIRUB SERPL-MCNC: 0.6 MG/DL (ref 0.1–1)
BUN SERPL-MCNC: 14 MG/DL (ref 6–20)
CALCIUM SERPL-MCNC: 9.2 MG/DL (ref 8.7–10.5)
CHLORIDE SERPL-SCNC: 109 MMOL/L (ref 95–110)
CHOLEST SERPL-MCNC: 173 MG/DL (ref 120–199)
CHOLEST/HDLC SERPL: 4.1 {RATIO} (ref 2–5)
CO2 SERPL-SCNC: 23 MMOL/L (ref 23–29)
CREAT SERPL-MCNC: 1.3 MG/DL (ref 0.5–1.4)
DIFFERENTIAL METHOD BLD: ABNORMAL
EOSINOPHIL # BLD AUTO: 0.2 K/UL (ref 0–0.5)
EOSINOPHIL NFR BLD: 4.9 % (ref 0–8)
ERYTHROCYTE [DISTWIDTH] IN BLOOD BY AUTOMATED COUNT: 12.8 % (ref 11.5–14.5)
EST. GFR  (NO RACE VARIABLE): >60 ML/MIN/1.73 M^2
ESTIMATED AVG GLUCOSE: 100 MG/DL (ref 68–131)
GLUCOSE SERPL-MCNC: 90 MG/DL (ref 70–110)
HBA1C MFR BLD: 5.1 % (ref 4–5.6)
HCT VFR BLD AUTO: 43.5 % (ref 40–54)
HDLC SERPL-MCNC: 42 MG/DL (ref 40–75)
HDLC SERPL: 24.3 % (ref 20–50)
HGB BLD-MCNC: 14.6 G/DL (ref 14–18)
IMM GRANULOCYTES # BLD AUTO: 0.01 K/UL (ref 0–0.04)
IMM GRANULOCYTES NFR BLD AUTO: 0.3 % (ref 0–0.5)
LDLC SERPL CALC-MCNC: 114.8 MG/DL (ref 63–159)
LYMPHOCYTES # BLD AUTO: 1.2 K/UL (ref 1–4.8)
LYMPHOCYTES NFR BLD: 36.1 % (ref 18–48)
MCH RBC QN AUTO: 29.8 PG (ref 27–31)
MCHC RBC AUTO-ENTMCNC: 33.6 G/DL (ref 32–36)
MCV RBC AUTO: 89 FL (ref 82–98)
MONOCYTES # BLD AUTO: 0.6 K/UL (ref 0.3–1)
MONOCYTES NFR BLD: 17.9 % (ref 4–15)
NEUTROPHILS # BLD AUTO: 1.3 K/UL (ref 1.8–7.7)
NEUTROPHILS NFR BLD: 40.5 % (ref 38–73)
NONHDLC SERPL-MCNC: 131 MG/DL
NRBC BLD-RTO: 0 /100 WBC
PLATELET # BLD AUTO: 203 K/UL (ref 150–450)
PMV BLD AUTO: 10.7 FL (ref 9.2–12.9)
POTASSIUM SERPL-SCNC: 4 MMOL/L (ref 3.5–5.1)
PROT SERPL-MCNC: 7.1 G/DL (ref 6–8.4)
RBC # BLD AUTO: 4.9 M/UL (ref 4.6–6.2)
SODIUM SERPL-SCNC: 142 MMOL/L (ref 136–145)
TRIGL SERPL-MCNC: 81 MG/DL (ref 30–150)
TSH SERPL DL<=0.005 MIU/L-ACNC: 2.14 UIU/ML (ref 0.4–4)
VIT B12 SERPL-MCNC: 317 PG/ML (ref 210–950)
WBC # BLD AUTO: 3.24 K/UL (ref 3.9–12.7)

## 2024-04-20 PROCEDURE — 80061 LIPID PANEL: CPT | Performed by: INTERNAL MEDICINE

## 2024-04-20 PROCEDURE — 85025 COMPLETE CBC W/AUTO DIFF WBC: CPT | Performed by: INTERNAL MEDICINE

## 2024-04-20 PROCEDURE — 82607 VITAMIN B-12: CPT | Performed by: INTERNAL MEDICINE

## 2024-04-20 PROCEDURE — 36415 COLL VENOUS BLD VENIPUNCTURE: CPT | Mod: PO | Performed by: INTERNAL MEDICINE

## 2024-04-20 PROCEDURE — 83036 HEMOGLOBIN GLYCOSYLATED A1C: CPT | Performed by: INTERNAL MEDICINE

## 2024-04-20 PROCEDURE — 84443 ASSAY THYROID STIM HORMONE: CPT | Performed by: INTERNAL MEDICINE

## 2024-04-20 PROCEDURE — 80053 COMPREHEN METABOLIC PANEL: CPT | Performed by: INTERNAL MEDICINE

## 2024-04-20 PROCEDURE — 82306 VITAMIN D 25 HYDROXY: CPT | Performed by: INTERNAL MEDICINE

## 2024-04-22 ENCOUNTER — PATIENT MESSAGE (OUTPATIENT)
Dept: INTERNAL MEDICINE | Facility: CLINIC | Age: 49
End: 2024-04-22
Payer: COMMERCIAL

## 2024-04-22 DIAGNOSIS — E55.9 VITAMIN D DEFICIENCY: Primary | ICD-10-CM

## 2024-04-22 PROBLEM — K76.0 NAFL (NONALCOHOLIC FATTY LIVER): Chronic | Status: ACTIVE | Noted: 2024-04-22

## 2024-04-22 RX ORDER — ERGOCALCIFEROL 1.25 MG/1
50000 CAPSULE ORAL
Qty: 12 CAPSULE | Refills: 1 | Status: SHIPPED | OUTPATIENT
Start: 2024-04-22

## 2024-04-22 NOTE — PROGRESS NOTES
Your lab results are generally looking ok.  There is still a fair amount of liver inflammation (high liver enzymes).  We evaluated you for this in 2021 with an ultrasound that showed some fatty liver changes.  Unfortunately there is not a great medication to fix his for you.  The main things to treat this are limiting alcohol to as little as possible, and generally maintaining a diet that limits fatty foods, highly processed foods, and highly preserved foods.    Your vitamin-D level also returned a good bit low.  I am sending in some vitamin-D replacement for you to take for 6 months.  Please note that this is once a WEEK dosing.    Sincerely,  Mark Anthony Hogan MD

## 2024-04-22 NOTE — ASSESSMENT & PLAN NOTE
FIB-4 Calculation: 0 at 4/22/2024  7:10 AM     FIB-4 below 1.30 is considered as low-risk for advanced fibrosis  FIB-4 over 2.67 is considered as high-risk for advanced fibrosis  FIB-4 values between 1.30 and 2.67 are considered as intermediate-risk of advanced fibrosis for ages 36-64.     For ages > 64 the cut-off for low-risk goes to < 2.  This is a screening tool and clinical judgement should be used in the interpretation of these results.

## 2024-05-10 DIAGNOSIS — I10 ESSENTIAL HYPERTENSION: Chronic | ICD-10-CM

## 2024-05-10 RX ORDER — HYDROCHLOROTHIAZIDE 12.5 MG/1
12.5 TABLET ORAL DAILY
Qty: 90 TABLET | Refills: 3 | Status: SHIPPED | OUTPATIENT
Start: 2024-05-10

## 2024-05-10 NOTE — TELEPHONE ENCOUNTER
Refill Decision Note   Brad Perera  is requesting a refill authorization.    Brief Assessment and Rationale for Refill:   Approve       Medication Therapy Plan:         Comments:     Note composed:1:47 PM 05/10/2024

## 2024-05-10 NOTE — TELEPHONE ENCOUNTER
No care due was identified.  Health [facility] Embedded Care Due Messages. Reference number: [id number].   5/10/2024 9:40:39 AM CDT

## 2024-06-10 ENCOUNTER — PATIENT MESSAGE (OUTPATIENT)
Dept: INTERNAL MEDICINE | Facility: CLINIC | Age: 49
End: 2024-06-10
Payer: COMMERCIAL

## 2024-07-19 NOTE — PROGRESS NOTES
Health Maintenance Due   Topic Date Due    Colorectal Cancer Screening  Never done    COVID-19 Vaccine (3 - Booster for Pfizer series) 02/28/2022     Chart was reviewed for overdue Proactive [facility] Encounters ([facility]) topics (CRS, Breast Cancer Screening, Eye exam)  Health Maintenance has been updated.  [facility] immunization registry triggered.  Immunizations were reconciled.    
Clear

## 2024-09-19 ENCOUNTER — TELEPHONE (OUTPATIENT)
Dept: UROLOGY | Facility: CLINIC | Age: 49
End: 2024-09-19

## 2024-10-21 DIAGNOSIS — E55.9 VITAMIN D DEFICIENCY: ICD-10-CM

## 2024-10-22 RX ORDER — ERGOCALCIFEROL 1.25 MG/1
CAPSULE ORAL
Qty: 12 CAPSULE | Refills: 1 | Status: SHIPPED | OUTPATIENT
Start: 2024-10-22

## 2024-10-22 NOTE — TELEPHONE ENCOUNTER
Refill Routing Note   Medication(s) are not appropriate for processing by [facility] for the following reason(s):        Outside of protocol    [facility] action(s):  Route               Appointments  past 12m or future 3m with PCP    Date Provider   Last Visit   4/17/2024 Mark Anthony Hogan MD   Next Visit   Visit date not found Mark Anthony Hogan MD   ED visits in past 90 days: 0        Note composed:7:42 AM 10/22/2024

## 2024-10-22 NOTE — TELEPHONE ENCOUNTER
No care due was identified.  Health [facility] Embedded Care Due Messages. Reference number: [id number].   10/22/2024 7:41:41 AM CDT

## 2025-01-12 NOTE — TELEPHONE ENCOUNTER
Care Due:                  Date            Visit Type   Department     Provider  --------------------------------------------------------------------------------                                EP -                              PRIMARY      [facility] INTERNAL  Last Visit: 04-      CARE ([facility])   MEDICINE       Mark Anthony Hogan  Next Visit: None Scheduled  None         None Found                                                            Last  Test          Frequency    Reason                     Performed    Due Date  --------------------------------------------------------------------------------    Office Visit  12 months..  meloxicam, polyethylene..  04- 04-    [facility] Embedded Care Due Messages. Reference number: [id number].   1/12/2025 4:35:24 PM CST

## 2025-01-13 RX ORDER — MELOXICAM 15 MG/1
TABLET ORAL
Qty: 90 TABLET | Refills: 0 | Status: SHIPPED | OUTPATIENT
Start: 2025-01-13

## 2025-01-13 NOTE — TELEPHONE ENCOUNTER
Refill Routing Note   Medication(s) are not appropriate for processing by [facility] for the following reason(s):        Outside of protocol    [facility] action(s):  Route   Requires appointment : Yes               Appointments  past 12m or future 3m with PCP    Date Provider   Last Visit   4/17/2024 Mark Anthony Hogan MD   Next Visit   Visit date not found Mark Anthony Hogan MD   ED visits in past 90 days: 0        Note composed:8:27 AM 01/13/2025

## 2025-05-13 DIAGNOSIS — N52.9 ERECTILE DYSFUNCTION, UNSPECIFIED ERECTILE DYSFUNCTION TYPE: ICD-10-CM

## 2025-05-14 DIAGNOSIS — I10 ESSENTIAL HYPERTENSION: ICD-10-CM

## 2025-05-14 RX ORDER — SILDENAFIL 100 MG/1
100 TABLET, FILM COATED ORAL DAILY PRN
Qty: 15 TABLET | Refills: 11 | Status: SHIPPED | OUTPATIENT
Start: 2025-05-14